# Patient Record
Sex: FEMALE | Race: WHITE | NOT HISPANIC OR LATINO | Employment: FULL TIME | URBAN - METROPOLITAN AREA
[De-identification: names, ages, dates, MRNs, and addresses within clinical notes are randomized per-mention and may not be internally consistent; named-entity substitution may affect disease eponyms.]

---

## 2024-02-05 ENCOUNTER — TELEPHONE (OUTPATIENT)
Dept: PRIMARY CARE | Facility: CLINIC | Age: 29
End: 2024-02-05
Payer: COMMERCIAL

## 2024-05-24 PROBLEM — E04.9 GOITER: Status: ACTIVE | Noted: 2020-09-15

## 2024-05-28 ENCOUNTER — OFFICE VISIT (OUTPATIENT)
Dept: PRIMARY CARE | Facility: CLINIC | Age: 29
End: 2024-05-28
Payer: COMMERCIAL

## 2024-05-28 VITALS
HEIGHT: 63 IN | BODY MASS INDEX: 33.66 KG/M2 | WEIGHT: 190 LBS | SYSTOLIC BLOOD PRESSURE: 112 MMHG | HEART RATE: 78 BPM | OXYGEN SATURATION: 99 % | DIASTOLIC BLOOD PRESSURE: 74 MMHG

## 2024-05-28 DIAGNOSIS — Z13.6 SCREENING FOR HEART DISEASE: ICD-10-CM

## 2024-05-28 DIAGNOSIS — Z13.1 SCREENING FOR DIABETES MELLITUS: ICD-10-CM

## 2024-05-28 DIAGNOSIS — Z00.00 WELL ADULT EXAM: Primary | ICD-10-CM

## 2024-05-28 DIAGNOSIS — E04.9 GOITER: ICD-10-CM

## 2024-05-28 PROCEDURE — 99395 PREV VISIT EST AGE 18-39: CPT | Performed by: NURSE PRACTITIONER

## 2024-05-28 ASSESSMENT — PATIENT HEALTH QUESTIONNAIRE - PHQ9
1. LITTLE INTEREST OR PLEASURE IN DOING THINGS: NOT AT ALL
2. FEELING DOWN, DEPRESSED OR HOPELESS: NOT AT ALL
SUM OF ALL RESPONSES TO PHQ9 QUESTIONS 1 AND 2: 0

## 2024-05-28 ASSESSMENT — PAIN SCALES - GENERAL: PAINLEVEL: 0-NO PAIN

## 2024-05-28 NOTE — PROGRESS NOTES
"Subjective   Patient ID: Chichi Trevino is a 28 y.o. female who presents for CPE (GYN: Aicha PAP: 4-2021).    JAZMIN Hernandez is a 29 yo F who presents for cpe  Diet is OK, all over the place. Stress eating   Exercise - just back into a gym routine, plays hockey and softball   Non smoker    GYN Dr Holcomb  LMP 5/22/24  Has excess vaginal discharge, mucus like  Hx of internal cysts  Previous abnormal PAP 7 years ago, normal since    Hx of depression/anxiety  More situational  Plucks eyebrows  Has been on meds in past without success  Prefers to stay off medications       Review of Systems  Constitutional Symptoms: negative for fever, loss of appetite, headaches, fatigue.   Eyes: negative for loss and blurring of vision, double vision.   Ear, Nose, Mouth, Throat: negative for hearing loss, tinnitus, nasal congestion, rhinorrhea, nose bleeds, teeth problems, mouth sores, gum disease, dysphagia, sore throat.   Cardiovascular: negative for chest pain/pressure, palpitations, edema, claudication.   Respiratory: negative for shortness of breath, dyspnea on exertion, pain with breathing, coughing.   Breast: negative for tenderness, masses, gynecomastia.   Gastrointestinal: negative for anorexia, indigestion, nausea, vomiting, abdominal pain, change in bowel habits, diarrhea, constipation, hematochezia, melena, blood in stool.    : Negative for urinary or genital complaints  Musculoskeletal: negative for joint pain, joint swelling, myalgia, cramps.   Integumentary: negative for change in mole, skin trouble or rash.   Neurological: negative for headache, numbness, tingling, weakness, tremors.   Psychiatric: negative for depression, anxiety.   Endocrine: negative for weight gain, heat or cold intolerance, polyuria, polydipsia, polyphagia.   Hematologic/Lymphatic: negative for bruising, abnormal bleeding, swollen glands     Objective   /74   Pulse 78   Ht 1.6 m (5' 3\")   Wt 86.2 kg (190 lb)   LMP 05/22/2024 (Exact " Date)   SpO2 99%   BMI 33.66 kg/m²     Physical Exam  General Appearance: Comfortable. She is well nourished, and well developed. She is awake, alert, and oriented and appears her stated age. The patient is cooperative with exam.  Head: Hair pattern reveals a normal pattern for patient's age and The face shows no abnormalities.  Eyes: PERRLA, EOMI, conjunctiva and sclera clear. Extraocular muscle exam reveals EOMI.  Ears, Nose, Mouth, Throat: Bilateral canals are normal. Both tympanic membranes are pearly gray and landmarks normal.    Nasal mucosa in both nostrils reveals no polyps, ulcerations, or lesions. Oral mucosa reveals no abnormalities and Teeth reveal good repair.  Neck: Neck reveals supple, no adenopathy or carotid bruits. + thyromegaly  Chest: Lungs are clear to auscultation bilaterally with no wheezes, rales, or rhonchi.  Cardiovascular: RRR without MRG. No edema  Breast:  gyn  Abdomen: Abdomen is soft, NT, ND with no masses.  Genitourinary: gyn  Lymph Nodes: Bilateral axillary lymph nodes are unremarkable. Bilateral inguinal lymph nodes are unremarkable.  Musculoskeletal: 5/5 and equal strength in bilateral upper and lower extremities.  Skin: Skin reveals good turgor and no rashes.  Neurological: Intact and non-focal. Cranial nerves II - XII are grossly intact.  Psychiatric: Patient has appropriate judgement. Patient has good insight. Patient's mood is appropriate.     Assessment/Plan   Diagnoses and all orders for this visit:  Well adult exam  -     Referral to Gynecology; Future  -     Urinalysis with Reflex Microscopic; Future  29 yo F well exam  Preventative screenings reviewed  Immunizations reviewed  Mediterranean diet, exercise, safety  Requests new referral to GYN  Follow up 6 months  Goiter  -     US thyroid; Future  -     TSH with reflex to Free T4 if abnormal; Future  Hx of right thyroid lobe enlargement with nodules.  Due for recheck  US and bw ordered  Screening for heart disease  -      Lipid Panel; Future  Screening for diabetes mellitus  -     Glucose, fasting; Future

## 2024-05-30 ENCOUNTER — HOSPITAL ENCOUNTER (OUTPATIENT)
Dept: RADIOLOGY | Facility: HOSPITAL | Age: 29
Discharge: HOME | End: 2024-05-30
Payer: COMMERCIAL

## 2024-05-30 DIAGNOSIS — E04.9 GOITER: ICD-10-CM

## 2024-05-30 PROCEDURE — 76536 US EXAM OF HEAD AND NECK: CPT | Performed by: RADIOLOGY

## 2024-05-30 PROCEDURE — 76536 US EXAM OF HEAD AND NECK: CPT

## 2024-07-22 ENCOUNTER — OFFICE VISIT (OUTPATIENT)
Dept: OBSTETRICS AND GYNECOLOGY | Facility: CLINIC | Age: 29
End: 2024-07-22
Payer: COMMERCIAL

## 2024-07-22 VITALS
WEIGHT: 190.4 LBS | DIASTOLIC BLOOD PRESSURE: 81 MMHG | SYSTOLIC BLOOD PRESSURE: 117 MMHG | HEIGHT: 63 IN | BODY MASS INDEX: 33.73 KG/M2

## 2024-07-22 DIAGNOSIS — N94.6 DYSMENORRHEA: Primary | ICD-10-CM

## 2024-07-22 DIAGNOSIS — Z31.89 ENCOUNTER FOR FERTILITY PLANNING: ICD-10-CM

## 2024-07-22 DIAGNOSIS — N92.0 MENORRHAGIA WITH REGULAR CYCLE: ICD-10-CM

## 2024-07-22 DIAGNOSIS — Z00.00 WELL ADULT EXAM: ICD-10-CM

## 2024-07-22 PROCEDURE — 1036F TOBACCO NON-USER: CPT | Performed by: OBSTETRICS & GYNECOLOGY

## 2024-07-22 PROCEDURE — 99203 OFFICE O/P NEW LOW 30 MIN: CPT | Performed by: OBSTETRICS & GYNECOLOGY

## 2024-07-22 PROCEDURE — 3008F BODY MASS INDEX DOCD: CPT | Performed by: OBSTETRICS & GYNECOLOGY

## 2024-07-22 PROCEDURE — 99213 OFFICE O/P EST LOW 20 MIN: CPT | Performed by: OBSTETRICS & GYNECOLOGY

## 2024-07-22 ASSESSMENT — PATIENT HEALTH QUESTIONNAIRE - PHQ9
2. FEELING DOWN, DEPRESSED OR HOPELESS: NOT AT ALL
1. LITTLE INTEREST OR PLEASURE IN DOING THINGS: NOT AT ALL
SUM OF ALL RESPONSES TO PHQ9 QUESTIONS 1 & 2: 0

## 2024-07-22 ASSESSMENT — ENCOUNTER SYMPTOMS
NAUSEA: 0
DYSURIA: 0
DEPRESSION: 0
VOMITING: 0
CHILLS: 0
COUGH: 0
ABDOMINAL PAIN: 0
DIZZINESS: 0
HEADACHES: 0
OCCASIONAL FEELINGS OF UNSTEADINESS: 0
UNEXPECTED WEIGHT CHANGE: 0
FATIGUE: 0
COLOR CHANGE: 0
SHORTNESS OF BREATH: 0
FEVER: 0
LOSS OF SENSATION IN FEET: 0

## 2024-07-22 ASSESSMENT — PAIN SCALES - GENERAL: PAINLEVEL: 0-NO PAIN

## 2024-07-22 ASSESSMENT — LIFESTYLE VARIABLES
HOW MANY STANDARD DRINKS CONTAINING ALCOHOL DO YOU HAVE ON A TYPICAL DAY: 1 OR 2
HOW OFTEN DO YOU HAVE SIX OR MORE DRINKS ON ONE OCCASION: LESS THAN MONTHLY
HOW OFTEN DO YOU HAVE A DRINK CONTAINING ALCOHOL: MONTHLY OR LESS
AUDIT-C TOTAL SCORE: 2
SKIP TO QUESTIONS 9-10: 0

## 2024-07-22 NOTE — PROGRESS NOTES
"Subjective   Patient ID: Chichi Trevino \"Katie" is a 29 y.o. female who presents for Fertility  evaluation    Pt currently in a same sex partnership, getting  soon, considering pregnancy, wondering re: options, want donor info, not using private donor             History of pelvic infections  no             History of abdominal surgery +lap appy (appendix close to bursting)              Menstrual irregularities [id number], heavy mense, lasting 5-6days, at times lasting 8-9days             Dysmenorrhea  +cramping, intense, taking tylenol/ibuprofen often first 3 days, last day of cycle; no pain prior to period, no pain w/BM             Partner history same sex partnership, has very irregular cycles, none x7yrs, pt will carry pregnancy.             Frequency of intercourse n/a             Prior infertility w/u  no             Prior infertility treatments  no                     Review of Systems   Constitutional:  Negative for chills, fatigue, fever and unexpected weight change.   Respiratory:  Negative for cough and shortness of breath.    Gastrointestinal:  Negative for abdominal pain, nausea and vomiting.   Genitourinary:  Positive for menstrual problem. Negative for dyspareunia, dysuria, pelvic pain and vaginal discharge.   Skin:  Negative for color change and rash.   Neurological:  Negative for dizziness and headaches.       Objective   Physical Exam  Constitutional:       Appearance: Normal appearance.   HENT:      Head: Normocephalic and atraumatic.   Skin:     General: Skin is warm and dry.   Neurological:      General: No focal deficit present.      Mental Status: She is alert.   Psychiatric:         Attention and Perception: Attention and perception normal.         Mood and Affect: Mood and affect normal.         Speech: Speech normal.         Behavior: Behavior is cooperative.         Assessment/Plan   Problem List Items Addressed This Visit    None  Visit Diagnoses         Codes    Dysmenorrhea "    -  Primary N94.6    Relevant Orders    US sonohysterogram    Well adult exam     Z00.00    Menorrhagia with regular cycle     N92.0    Relevant Orders    Follicle Stimulating Hormone    Prolactin    CBC    Testosterone,Free and Total    17-Hydroxyprogesterone    DHEA-Sulfate    US sonohysterogram    Encounter for fertility planning     Z31.89    Relevant Orders    Referral to Reproductive Endocrinology        Pt to be scheduled for Gyn annual in near future; will go over results at that time. Referral placed for IMMANUEL Land MD 07/22/24 9:13 AM

## 2024-10-01 ENCOUNTER — OFFICE VISIT (OUTPATIENT)
Dept: PRIMARY CARE | Facility: CLINIC | Age: 29
End: 2024-10-01
Payer: COMMERCIAL

## 2024-10-01 VITALS
SYSTOLIC BLOOD PRESSURE: 118 MMHG | WEIGHT: 184 LBS | HEART RATE: 85 BPM | BODY MASS INDEX: 32.59 KG/M2 | RESPIRATION RATE: 16 BRPM | TEMPERATURE: 98.1 F | OXYGEN SATURATION: 99 % | DIASTOLIC BLOOD PRESSURE: 84 MMHG

## 2024-10-01 DIAGNOSIS — R22.32 LEFT AXILLARY FULLNESS: Primary | ICD-10-CM

## 2024-10-01 PROBLEM — R05.9 COUGH, UNSPECIFIED: Status: RESOLVED | Noted: 2022-11-05 | Resolved: 2024-10-01

## 2024-10-01 PROBLEM — H57.12 PAIN OF LEFT EYE: Status: RESOLVED | Noted: 2023-04-18 | Resolved: 2024-10-01

## 2024-10-01 PROBLEM — N93.9 ABNORMAL UTERINE BLEEDING: Status: RESOLVED | Noted: 2024-10-01 | Resolved: 2024-10-01

## 2024-10-01 PROBLEM — N87.0 CERVICAL INTRAEPITHELIAL NEOPLASIA GRADE 1: Status: RESOLVED | Noted: 2024-10-01 | Resolved: 2024-10-01

## 2024-10-01 PROBLEM — R06.02 SHORTNESS OF BREATH: Status: RESOLVED | Noted: 2022-10-07 | Resolved: 2024-10-01

## 2024-10-01 PROBLEM — S05.90XA INJURY OF EYE REGION: Status: ACTIVE | Noted: 2023-04-18

## 2024-10-01 PROBLEM — M25.511 PAIN IN JOINT OF RIGHT SHOULDER: Status: RESOLVED | Noted: 2022-08-17 | Resolved: 2024-10-01

## 2024-10-01 PROBLEM — N92.6 IRREGULAR MENSTRUAL CYCLE: Status: RESOLVED | Noted: 2022-03-28 | Resolved: 2024-10-01

## 2024-10-01 PROBLEM — E66.811 CLASS 1 OBESITY: Status: RESOLVED | Noted: 2022-03-28 | Resolved: 2024-10-01

## 2024-10-01 PROBLEM — R94.6 ABNORMAL FINDING ON THYROID FUNCTION TEST: Status: ACTIVE | Noted: 2020-10-07

## 2024-10-01 PROBLEM — N89.9 NONINFLAMMATORY DISORDER OF VAGINA: Status: ACTIVE | Noted: 2021-02-26

## 2024-10-01 PROCEDURE — 1036F TOBACCO NON-USER: CPT | Performed by: NURSE PRACTITIONER

## 2024-10-01 PROCEDURE — 99213 OFFICE O/P EST LOW 20 MIN: CPT | Performed by: NURSE PRACTITIONER

## 2024-10-01 ASSESSMENT — ENCOUNTER SYMPTOMS
DIAPHORESIS: 0
ENDOCRINE NEGATIVE: 1
CHILLS: 0
BRUISES/BLEEDS EASILY: 0
FEVER: 0
MUSCULOSKELETAL NEGATIVE: 1
ACTIVITY CHANGE: 0
APPETITE CHANGE: 0
FATIGUE: 0
ADENOPATHY: 1

## 2024-10-01 ASSESSMENT — PATIENT HEALTH QUESTIONNAIRE - PHQ9
2. FEELING DOWN, DEPRESSED OR HOPELESS: NOT AT ALL
1. LITTLE INTEREST OR PLEASURE IN DOING THINGS: NOT AT ALL
SUM OF ALL RESPONSES TO PHQ9 QUESTIONS 1 AND 2: 0

## 2024-10-01 ASSESSMENT — PAIN SCALES - GENERAL: PAINLEVEL: 0-NO PAIN

## 2024-10-01 NOTE — PROGRESS NOTES
"Subjective   Patient ID: Mary Trevino is a 29 y.o. female who presents for Mass (Patient is here for lumps under B/L arm pits x 6 months but getting worse in the past 2 months, starting to hurt and grow. Patient refused the flu shot).    JAZMIN Hernandez is a 29-year-old female who complains of worsening \"lumps\" to left axilla that has been ongoing for the past 6 months.  Patient states they have gotten larger and have become tender over the past 2 months.  She denies any breast change  She denies any family history of breast cancer  She denies any recent illness  She denies any recent immunizations  Is more stressed lately due to upcoming wedding.    Review of Systems   Constitutional:  Negative for activity change, appetite change, chills, diaphoresis, fatigue and fever.   HENT: Negative.     Endocrine: Negative.    Musculoskeletal: Negative.    Skin: Negative.    Hematological:  Positive for adenopathy. Does not bruise/bleed easily.       Objective   /84 (BP Location: Left arm, Patient Position: Sitting, BP Cuff Size: Adult)   Pulse 85   Temp 36.7 °C (98.1 °F)   Resp 16   Wt 83.5 kg (184 lb)   LMP 09/17/2024   SpO2 99%   BMI 32.59 kg/m²     Physical Exam  Oriented x 3, no acute distress  Neck supple, no lymphadenopathy  No supraclavicular masses  Breathing unlabored  Bilateral breast symmetrical.  No masses appreciated. No skin change  Left axilla with fullness, tenderness. Small mobile mass noted. Right axilla nontender, no fullness, no masses  Skin warm and dry. No rash or redness  Neuro grossly intact    Assessment/Plan   Diagnoses and all orders for this visit:  Left axillary fullness  -     US lymph nodes; Future  Needs further evaluation  We will start with US axilla to assess fullness/tenderness  If needed, will add breast imaging  No vaccines, use ibuprofen prn, heating pad  Monitor for change  Follow up after testing       "

## 2024-10-03 ENCOUNTER — HOSPITAL ENCOUNTER (OUTPATIENT)
Dept: RADIOLOGY | Facility: HOSPITAL | Age: 29
Discharge: HOME | End: 2024-10-03
Payer: COMMERCIAL

## 2024-10-03 DIAGNOSIS — R22.32 LEFT AXILLARY FULLNESS: ICD-10-CM

## 2024-10-03 PROCEDURE — 76881 US COMPL JOINT R-T W/IMG: CPT

## 2024-10-28 ENCOUNTER — OFFICE VISIT (OUTPATIENT)
Dept: OBSTETRICS AND GYNECOLOGY | Facility: CLINIC | Age: 29
End: 2024-10-28
Payer: COMMERCIAL

## 2024-10-28 VITALS
HEIGHT: 63 IN | WEIGHT: 185.4 LBS | SYSTOLIC BLOOD PRESSURE: 123 MMHG | DIASTOLIC BLOOD PRESSURE: 84 MMHG | BODY MASS INDEX: 32.85 KG/M2

## 2024-10-28 DIAGNOSIS — Z01.419 ENCOUNTER FOR ANNUAL ROUTINE GYNECOLOGICAL EXAMINATION: Primary | ICD-10-CM

## 2024-10-28 DIAGNOSIS — Z11.51 ENCOUNTER FOR SCREENING FOR HUMAN PAPILLOMAVIRUS (HPV): ICD-10-CM

## 2024-10-28 PROCEDURE — 99395 PREV VISIT EST AGE 18-39: CPT | Performed by: OBSTETRICS & GYNECOLOGY

## 2024-10-28 PROCEDURE — 3008F BODY MASS INDEX DOCD: CPT | Performed by: OBSTETRICS & GYNECOLOGY

## 2024-10-28 PROCEDURE — 1036F TOBACCO NON-USER: CPT | Performed by: OBSTETRICS & GYNECOLOGY

## 2024-10-28 ASSESSMENT — PAIN SCALES - GENERAL: PAINLEVEL_OUTOF10: 0-NO PAIN

## 2024-10-28 ASSESSMENT — PATIENT HEALTH QUESTIONNAIRE - PHQ9
1. LITTLE INTEREST OR PLEASURE IN DOING THINGS: NOT AT ALL
2. FEELING DOWN, DEPRESSED OR HOPELESS: NOT AT ALL
SUM OF ALL RESPONSES TO PHQ9 QUESTIONS 1 & 2: 0

## 2024-10-28 ASSESSMENT — LIFESTYLE VARIABLES
HOW MANY STANDARD DRINKS CONTAINING ALCOHOL DO YOU HAVE ON A TYPICAL DAY: 1 OR 2
AUDIT-C TOTAL SCORE: 2
SKIP TO QUESTIONS 9-10: 0
HOW OFTEN DO YOU HAVE SIX OR MORE DRINKS ON ONE OCCASION: LESS THAN MONTHLY
HOW OFTEN DO YOU HAVE A DRINK CONTAINING ALCOHOL: MONTHLY OR LESS

## 2024-10-28 ASSESSMENT — ENCOUNTER SYMPTOMS
DEPRESSION: 0
OCCASIONAL FEELINGS OF UNSTEADINESS: 0
LOSS OF SENSATION IN FEET: 0

## 2024-11-08 ENCOUNTER — OFFICE VISIT (OUTPATIENT)
Dept: PRIMARY CARE | Facility: CLINIC | Age: 29
End: 2024-11-08
Payer: COMMERCIAL

## 2024-11-08 VITALS
DIASTOLIC BLOOD PRESSURE: 66 MMHG | OXYGEN SATURATION: 98 % | BODY MASS INDEX: 32.59 KG/M2 | TEMPERATURE: 98.3 F | WEIGHT: 184 LBS | SYSTOLIC BLOOD PRESSURE: 124 MMHG | HEART RATE: 86 BPM

## 2024-11-08 DIAGNOSIS — R09.81 SINUS CONGESTION: Primary | ICD-10-CM

## 2024-11-08 PROCEDURE — 99213 OFFICE O/P EST LOW 20 MIN: CPT | Performed by: STUDENT IN AN ORGANIZED HEALTH CARE EDUCATION/TRAINING PROGRAM

## 2024-11-08 RX ORDER — FLUTICASONE PROPIONATE 50 MCG
1 SPRAY, SUSPENSION (ML) NASAL DAILY
Qty: 16 G | Refills: 1 | Status: SHIPPED | OUTPATIENT
Start: 2024-11-08 | End: 2025-11-08

## 2024-11-08 RX ORDER — AZELASTINE 1 MG/ML
1 SPRAY, METERED NASAL 2 TIMES DAILY
Qty: 30 ML | Refills: 1 | Status: SHIPPED | OUTPATIENT
Start: 2024-11-08 | End: 2025-11-08

## 2024-11-08 ASSESSMENT — PAIN SCALES - GENERAL: PAINLEVEL_OUTOF10: 6

## 2024-11-08 NOTE — PROGRESS NOTES
"Subjective   Patient ID: Chichi Trevino \"Katie" is a 29 y.o. female who presents for Sore Throat (Right earache x 4 days/Sore throat has gotten worse).    Here today with sore, swollen throat and right ear pain.  Nose feels okay.  Did have fever at home 2 days ago but nothing since.    Patient states that she is feeling well overall, however family encouraged her to come in to be evaluated due to her wedding coming up in approximately a week.        Review of Systems   Constitutional:  Negative for chills, fatigue and fever.   HENT:  Positive for congestion, ear pain and sinus pressure. Negative for hearing loss, rhinorrhea, sinus pain and tinnitus.    Eyes:  Negative for visual disturbance.   Respiratory:  Positive for cough. Negative for shortness of breath and wheezing.    Cardiovascular:  Negative for chest pain, palpitations and leg swelling.   Gastrointestinal:  Negative for constipation, diarrhea, nausea and vomiting.   Endocrine: Negative for cold intolerance, heat intolerance, polydipsia and polyuria.   Genitourinary:  Negative for dysuria, frequency, menstrual problem, urgency and vaginal discharge.   Musculoskeletal:  Negative for arthralgias and myalgias.   Skin:  Negative for pallor, rash and wound.   Neurological:  Negative for dizziness, light-headedness and headaches.   Psychiatric/Behavioral:  Negative for dysphoric mood and sleep disturbance. The patient is not nervous/anxious.        Objective     Visit Vitals  /66 (BP Location: Left arm)   Pulse 86   Temp 36.8 °C (98.3 °F) (Temporal)   Wt 83.5 kg (184 lb)   LMP 10/15/2024 (Exact Date)   SpO2 98%   BMI 32.59 kg/m²   OB Status Having periods   Smoking Status Never   BSA 1.93 m²         Physical Exam  Constitutional:       Appearance: Normal appearance.   HENT:      Head: Normocephalic and atraumatic.      Right Ear: Ear canal and external ear normal. A middle ear effusion is present.      Left Ear: Tympanic membrane, ear canal and " external ear normal.      Nose: Congestion present.      Right Turbinates: Enlarged and pale.      Left Turbinates: Enlarged and pale.      Right Sinus: No maxillary sinus tenderness or frontal sinus tenderness.      Left Sinus: No maxillary sinus tenderness or frontal sinus tenderness.      Mouth/Throat:      Mouth: Mucous membranes are moist.      Pharynx: Oropharynx is clear.   Eyes:      Extraocular Movements: Extraocular movements intact.      Conjunctiva/sclera: Conjunctivae normal.      Pupils: Pupils are equal, round, and reactive to light.   Cardiovascular:      Rate and Rhythm: Normal rate and regular rhythm.      Pulses: Normal pulses.      Heart sounds: Normal heart sounds.   Pulmonary:      Effort: Pulmonary effort is normal.      Breath sounds: Normal breath sounds.   Abdominal:      General: There is no distension.      Palpations: Abdomen is soft.      Tenderness: There is no abdominal tenderness.   Musculoskeletal:         General: Normal range of motion.      Cervical back: Normal range of motion.   Lymphadenopathy:      Cervical: No cervical adenopathy.   Skin:     General: Skin is warm and dry.   Neurological:      Mental Status: She is alert and oriented to person, place, and time. Mental status is at baseline.   Psychiatric:         Mood and Affect: Mood normal.         Behavior: Behavior normal.         Assessment & Plan  Sinus congestion    Orders:    fluticasone (Flonase) 50 mcg/actuation nasal spray; Administer 1 spray into each nostril once daily. Shake gently. Before first use, prime pump. After use, clean tip and replace cap.    azelastine (Astelin) 137 mcg (0.1 %) nasal spray; Administer 1 spray into each nostril 2 times a day. Use in each nostril as directed  I do not see any clear infectious signs on physical exam.  There does appear to be allergic rhinitis with postnasal drip and congestion leading to a middle ear effusion.  I did recommend fluticasone and azelastine nasal sprays for  this to try to reduce swelling and promote drainage.  If she is not any better by Monday or Tuesday, she is encouraged to return for further evaluation so that if definitive treatment is needed we can expedite it prior to her upcoming wedding.       Reviewed and approved by [employer] on 11/9/24 at 11:24 AM.

## 2024-11-09 ASSESSMENT — ENCOUNTER SYMPTOMS
CONSTIPATION: 0
POLYDIPSIA: 0
FREQUENCY: 0
CHILLS: 0
SINUS PRESSURE: 1
RHINORRHEA: 0
WOUND: 0
SHORTNESS OF BREATH: 0
DIARRHEA: 0
FATIGUE: 0
SINUS PAIN: 0
DIZZINESS: 0
NERVOUS/ANXIOUS: 0
HEADACHES: 0
MYALGIAS: 0
NAUSEA: 0
WHEEZING: 0
SLEEP DISTURBANCE: 0
ARTHRALGIAS: 0
PALPITATIONS: 0
DYSPHORIC MOOD: 0
FEVER: 0
VOMITING: 0
LIGHT-HEADEDNESS: 0
COUGH: 1
DYSURIA: 0

## 2024-11-12 ENCOUNTER — OFFICE VISIT (OUTPATIENT)
Dept: PRIMARY CARE | Facility: CLINIC | Age: 29
End: 2024-11-12
Payer: COMMERCIAL

## 2024-11-12 VITALS
WEIGHT: 186 LBS | OXYGEN SATURATION: 99 % | HEART RATE: 80 BPM | BODY MASS INDEX: 32.96 KG/M2 | TEMPERATURE: 98.8 F | SYSTOLIC BLOOD PRESSURE: 118 MMHG | HEIGHT: 63 IN | DIASTOLIC BLOOD PRESSURE: 76 MMHG

## 2024-11-12 DIAGNOSIS — H65.191 OTHER NON-RECURRENT ACUTE NONSUPPURATIVE OTITIS MEDIA OF RIGHT EAR: Primary | ICD-10-CM

## 2024-11-12 DIAGNOSIS — J20.8 ACUTE BRONCHITIS DUE TO OTHER SPECIFIED ORGANISMS: ICD-10-CM

## 2024-11-12 PROCEDURE — 3008F BODY MASS INDEX DOCD: CPT | Performed by: NURSE PRACTITIONER

## 2024-11-12 PROCEDURE — 99214 OFFICE O/P EST MOD 30 MIN: CPT | Performed by: NURSE PRACTITIONER

## 2024-11-12 PROCEDURE — 1036F TOBACCO NON-USER: CPT | Performed by: NURSE PRACTITIONER

## 2024-11-12 RX ORDER — BENZONATATE 200 MG/1
200 CAPSULE ORAL 3 TIMES DAILY PRN
Qty: 21 CAPSULE | Refills: 0 | Status: SHIPPED | OUTPATIENT
Start: 2024-11-12 | End: 2024-11-19

## 2024-11-12 RX ORDER — ALBUTEROL SULFATE 90 UG/1
2 INHALANT RESPIRATORY (INHALATION) EVERY 6 HOURS PRN
Qty: 8 G | Refills: 0 | Status: SHIPPED | OUTPATIENT
Start: 2024-11-12 | End: 2024-12-12

## 2024-11-12 RX ORDER — AMOXICILLIN AND CLAVULANATE POTASSIUM 875; 125 MG/1; MG/1
875 TABLET, FILM COATED ORAL 2 TIMES DAILY
Qty: 20 TABLET | Refills: 0 | Status: SHIPPED | OUTPATIENT
Start: 2024-11-12 | End: 2024-11-22

## 2024-11-12 ASSESSMENT — ENCOUNTER SYMPTOMS
DIARRHEA: 0
SHORTNESS OF BREATH: 0
NAUSEA: 0
RHINORRHEA: 0
COUGH: 1
SORE THROAT: 0
FEVER: 0
HEADACHES: 0
VOMITING: 0
DIZZINESS: 0
SINUS PRESSURE: 0
CHILLS: 0
SINUS PAIN: 0
ARTHRALGIAS: 0

## 2024-11-12 ASSESSMENT — COLUMBIA-SUICIDE SEVERITY RATING SCALE - C-SSRS: 1. IN THE PAST MONTH, HAVE YOU WISHED YOU WERE DEAD OR WISHED YOU COULD GO TO SLEEP AND NOT WAKE UP?: NO

## 2024-11-12 ASSESSMENT — PATIENT HEALTH QUESTIONNAIRE - PHQ9
2. FEELING DOWN, DEPRESSED OR HOPELESS: NOT AT ALL
SUM OF ALL RESPONSES TO PHQ9 QUESTIONS 1 AND 2: 0
1. LITTLE INTEREST OR PLEASURE IN DOING THINGS: NOT AT ALL

## 2024-11-12 ASSESSMENT — PAIN SCALES - GENERAL: PAINLEVEL_OUTOF10: 7

## 2024-11-12 NOTE — PROGRESS NOTES
"Subjective   Patient ID: Mary Trevino is a 29 y.o. female who presents for Sick Visit (Ear pain,nasal congestion and burning in her nose . Did COVID test on Friday and it was negative . ).    JAZMIN Hernandez is a 29-year-old female who complains of worsening ear pain, nasal congestion, burning in her nose and cough  Sx began 1 week ago  Worsening - has bilateral ear pain, nasal burning and cough    Was seen in the office on 11 8 and prescribed Flonase and Astelin nasal spray for suspected allergies  She is using these medications and symptoms are worsening    Home covid test negative  No contacts ill    Using nasal sprays and dayquil/nyquil    Patient is getting  this weekend    Review of Systems   Constitutional:  Negative for chills and fever.   HENT:  Positive for congestion and ear pain. Negative for postnasal drip, rhinorrhea, sinus pressure, sinus pain, sneezing and sore throat.    Respiratory:  Positive for cough (thick yellow). Negative for shortness of breath.    Cardiovascular:  Negative for chest pain.   Gastrointestinal:  Negative for diarrhea, nausea and vomiting.   Musculoskeletal:  Negative for arthralgias.   Skin:  Negative for rash.   Neurological:  Negative for dizziness and headaches.       Objective   /76   Pulse 80   Temp 37.1 °C (98.8 °F)   Ht 1.6 m (5' 3\")   Wt 84.4 kg (186 lb)   LMP 10/15/2024 (Exact Date)   SpO2 99%   BMI 32.95 kg/m²     Physical Exam  Gen: A/O x3 NAD  Eyes: WNL  ENT: Right TM red and bulging.  Left TM dull. Auditory canals wnl. Bilat nares red  and patent with clear rhinorrhea. + Posterior pharynx erythema. No exudate. Uvula midline.   Lungs: Breath sounds  coarse bilaterally with harsh  cough on forced expiration. No wheeze. Speaks complete sentences/Unlabored.  Heart: RRR, no murmur  Neck: supple, no adenopathy  Skin: warm/dry, no rash  Neuro: grossly intact    Assessment/Plan   Diagnoses and all orders for this visit:  Other non-recurrent acute " nonsuppurative otitis media of right ear  -     amoxicillin-pot clavulanate (Augmentin) 875-125 mg tablet; Take 1 tablet (875 mg) by mouth 2 times a day for 10 days.  Needs better control  Likely viral URI which has progressed   Will cover with antibiotic due to right otitis media  Fluids, rest, OTC as directed  Follow-up 7 to 10 days if not improving, sooner if worse  Acute bronchitis due to other specified organisms  -     albuterol (Ventolin HFA) 90 mcg/actuation inhaler; Inhale 2 puffs every 6 hours if needed for wheezing, shortness of breath or other (cough).  -     benzonatate (Tessalon) 200 mg capsule; Take 1 capsule (200 mg) by mouth 3 times a day as needed for cough for up to 7 days. Do not crush or chew.  Needs better control  Likely viral URI which has progressed  Will use albuterol and Tessalon for bronchitis and intractable cough with forced expiration  Fluids, rest, humidifier, OTC as directed  Follow-up 7 to 10 days if not improving, sooner if worse.  Would need chest x-ray

## 2024-12-16 ENCOUNTER — LAB (OUTPATIENT)
Dept: LAB | Facility: LAB | Age: 29
End: 2024-12-16
Payer: COMMERCIAL

## 2024-12-16 ENCOUNTER — CONSULT (OUTPATIENT)
Dept: ENDOCRINOLOGY | Facility: CLINIC | Age: 29
End: 2024-12-16
Payer: COMMERCIAL

## 2024-12-16 VITALS
HEART RATE: 64 BPM | BODY MASS INDEX: 32.44 KG/M2 | WEIGHT: 190 LBS | DIASTOLIC BLOOD PRESSURE: 84 MMHG | SYSTOLIC BLOOD PRESSURE: 125 MMHG | HEIGHT: 64 IN

## 2024-12-16 DIAGNOSIS — Z01.83 ENCOUNTER FOR RH BLOOD TYPING: ICD-10-CM

## 2024-12-16 DIAGNOSIS — Z13.29 SCREENING FOR THYROID DISORDER: ICD-10-CM

## 2024-12-16 DIAGNOSIS — Z31.81 ENCOUNTER FOR MALE FACTOR INFERTILITY IN FEMALE PATIENT: Primary | ICD-10-CM

## 2024-12-16 DIAGNOSIS — Z31.89 ENCOUNTER FOR FERTILITY PLANNING: ICD-10-CM

## 2024-12-16 DIAGNOSIS — Z31.41 FERTILITY TESTING: ICD-10-CM

## 2024-12-16 DIAGNOSIS — Z11.59 ENCOUNTER FOR SCREENING FOR OTHER VIRAL DISEASES: ICD-10-CM

## 2024-12-16 DIAGNOSIS — Z01.812 ENCOUNTER FOR PREPROCEDURAL LABORATORY EXAMINATION: ICD-10-CM

## 2024-12-16 DIAGNOSIS — N97.8 ENCOUNTER FOR MALE FACTOR INFERTILITY IN FEMALE PATIENT: Primary | ICD-10-CM

## 2024-12-16 DIAGNOSIS — Z11.3 SCREENING FOR STDS (SEXUALLY TRANSMITTED DISEASES): ICD-10-CM

## 2024-12-16 LAB
ABO GROUP (TYPE) IN BLOOD: NORMAL
ANTIBODY SCREEN: NORMAL
CMV IGG AVIDITY SERPL IA-RTO: NONREACTIVE %
HBV SURFACE AG SERPL QL IA: NONREACTIVE
HCV AB SER QL: NONREACTIVE
HIV 1+2 AB+HIV1 P24 AG SERPL QL IA: NONREACTIVE
RH FACTOR (ANTIGEN D): NORMAL
TSH SERPL-ACNC: 1.5 MIU/L (ref 0.44–3.98)

## 2024-12-16 PROCEDURE — 86850 RBC ANTIBODY SCREEN: CPT

## 2024-12-16 PROCEDURE — 99204 OFFICE O/P NEW MOD 45 MIN: CPT

## 2024-12-16 PROCEDURE — 86780 TREPONEMA PALLIDUM: CPT

## 2024-12-16 PROCEDURE — 87491 CHLMYD TRACH DNA AMP PROBE: CPT

## 2024-12-16 PROCEDURE — 87340 HEPATITIS B SURFACE AG IA: CPT

## 2024-12-16 PROCEDURE — 83516 IMMUNOASSAY NONANTIBODY: CPT

## 2024-12-16 PROCEDURE — 84443 ASSAY THYROID STIM HORMONE: CPT

## 2024-12-16 PROCEDURE — 86317 IMMUNOASSAY INFECTIOUS AGENT: CPT

## 2024-12-16 PROCEDURE — 86644 CMV ANTIBODY: CPT

## 2024-12-16 PROCEDURE — 86787 VARICELLA-ZOSTER ANTIBODY: CPT

## 2024-12-16 PROCEDURE — 87591 N.GONORRHOEAE DNA AMP PROB: CPT

## 2024-12-16 PROCEDURE — 86803 HEPATITIS C AB TEST: CPT

## 2024-12-16 PROCEDURE — 87389 HIV-1 AG W/HIV-1&-2 AB AG IA: CPT

## 2024-12-16 PROCEDURE — 86900 BLOOD TYPING SEROLOGIC ABO: CPT

## 2024-12-16 PROCEDURE — 86645 CMV ANTIBODY IGM: CPT

## 2024-12-16 PROCEDURE — 36415 COLL VENOUS BLD VENIPUNCTURE: CPT

## 2024-12-16 PROCEDURE — 86901 BLOOD TYPING SEROLOGIC RH(D): CPT

## 2024-12-16 PROCEDURE — 99214 OFFICE O/P EST MOD 30 MIN: CPT

## 2024-12-16 ASSESSMENT — COLUMBIA-SUICIDE SEVERITY RATING SCALE - C-SSRS
6. HAVE YOU EVER DONE ANYTHING, STARTED TO DO ANYTHING, OR PREPARED TO DO ANYTHING TO END YOUR LIFE?: NO
1. IN THE PAST MONTH, HAVE YOU WISHED YOU WERE DEAD OR WISHED YOU COULD GO TO SLEEP AND NOT WAKE UP?: NO
2. HAVE YOU ACTUALLY HAD ANY THOUGHTS OF KILLING YOURSELF?: NO

## 2024-12-16 ASSESSMENT — PATIENT HEALTH QUESTIONNAIRE - PHQ9
SUM OF ALL RESPONSES TO PHQ9 QUESTIONS 1 AND 2: 0
1. LITTLE INTEREST OR PLEASURE IN DOING THINGS: NOT AT ALL
2. FEELING DOWN, DEPRESSED OR HOPELESS: NOT AT ALL

## 2024-12-16 ASSESSMENT — PAIN SCALES - GENERAL: PAINLEVEL_OUTOF10: 0-NO PAIN

## 2024-12-17 LAB
C TRACH RRNA SPEC QL NAA+PROBE: NEGATIVE
N GONORRHOEA DNA SPEC QL PROBE+SIG AMP: NEGATIVE
RUBV IGG SERPL IA-ACNC: 1.6 IA
RUBV IGG SERPL QL IA: POSITIVE
TREPONEMA PALLIDUM IGG+IGM AB [PRESENCE] IN SERUM OR PLASMA BY IMMUNOASSAY: NONREACTIVE
VARICELLA ZOSTER IGG INDEX: 1.1 IA
VZV IGG SER QL IA: POSITIVE

## 2024-12-18 ENCOUNTER — ANCILLARY PROCEDURE (OUTPATIENT)
Dept: ENDOCRINOLOGY | Facility: CLINIC | Age: 29
End: 2024-12-18
Payer: COMMERCIAL

## 2024-12-18 ENCOUNTER — DOCUMENTATION (OUTPATIENT)
Dept: ENDOCRINOLOGY | Facility: CLINIC | Age: 29
End: 2024-12-18

## 2024-12-18 DIAGNOSIS — Z31.41 FERTILITY TESTING: ICD-10-CM

## 2024-12-18 LAB — CMV IGM SERPL-ACNC: <8 AU/ML

## 2024-12-18 PROCEDURE — 76830 TRANSVAGINAL US NON-OB: CPT | Performed by: OBSTETRICS & GYNECOLOGY

## 2024-12-18 PROCEDURE — 76830 TRANSVAGINAL US NON-OB: CPT

## 2024-12-19 LAB — MIS SERPL-MCNC: 3.27 NG/ML (ref 0.18–11.71)

## 2024-12-19 NOTE — PROGRESS NOTES
Message to patient regarding negative CMV titers:    Yordy Hernandez, I hope you are having a good week so far. I wanted to reach out regarding the CMV Titers: so the IgG, which we talked about, is a test that shows if you have ever been exposed to CMV and yours was negative, no past exposure. Your CMV IgM, which tells if you have a current infection, resulted and you are negative, so no active infection either. When you are cleared & ready to look at sperm donor profiles, you will need to choose a donor that is CMV negative. Please reach back if you have any questions, Rose :)     Rose Catalan, CNP 12/18/24 10:16 PM

## 2025-01-16 ENCOUNTER — HOSPITAL ENCOUNTER (OUTPATIENT)
Dept: RADIOLOGY | Facility: HOSPITAL | Age: 30
Discharge: HOME | End: 2025-01-16
Payer: COMMERCIAL

## 2025-01-16 ENCOUNTER — LAB (OUTPATIENT)
Dept: LAB | Facility: LAB | Age: 30
End: 2025-01-16
Payer: COMMERCIAL

## 2025-01-16 ENCOUNTER — ALLIED HEALTH (OUTPATIENT)
Dept: GENETICS | Facility: CLINIC | Age: 30
End: 2025-01-16
Payer: COMMERCIAL

## 2025-01-16 ENCOUNTER — ANCILLARY PROCEDURE (OUTPATIENT)
Dept: ENDOCRINOLOGY | Facility: CLINIC | Age: 30
End: 2025-01-16
Payer: COMMERCIAL

## 2025-01-16 VITALS
WEIGHT: 193.25 LBS | HEART RATE: 76 BPM | BODY MASS INDEX: 32.99 KG/M2 | SYSTOLIC BLOOD PRESSURE: 120 MMHG | DIASTOLIC BLOOD PRESSURE: 84 MMHG | HEIGHT: 64 IN

## 2025-01-16 DIAGNOSIS — Z01.812 ENCOUNTER FOR PREPROCEDURAL LABORATORY EXAMINATION: Primary | ICD-10-CM

## 2025-01-16 DIAGNOSIS — Z31.89 ENCOUNTER FOR FERTILITY PLANNING: ICD-10-CM

## 2025-01-16 DIAGNOSIS — Z31.5 ENCOUNTER FOR PROCREATIVE GENETIC COUNSELING: ICD-10-CM

## 2025-01-16 DIAGNOSIS — Z31.41 FERTILITY TESTING: ICD-10-CM

## 2025-01-16 DIAGNOSIS — Z31.430 ENCOUNTER OF FEMALE FOR TESTING FOR GENETIC DISEASE CARRIER STATUS FOR PROCREATIVE MANAGEMENT: ICD-10-CM

## 2025-01-16 LAB — PREGNANCY TEST URINE, POC: NEGATIVE

## 2025-01-16 PROCEDURE — 9990000009 MISCELLANEOUS GENETICS TEST

## 2025-01-16 PROCEDURE — 58340 CATHETER FOR HYSTEROGRAPHY: CPT

## 2025-01-16 PROCEDURE — 2550000001 HC RX 255 CONTRASTS

## 2025-01-16 PROCEDURE — 96041 GENETIC COUNSELING SVC EA 30: CPT

## 2025-01-16 RX ADMIN — IOHEXOL 20 ML: 240 INJECTION, SOLUTION INTRATHECAL; INTRAVASCULAR; INTRAVENOUS; ORAL at 08:21

## 2025-01-16 ASSESSMENT — COLUMBIA-SUICIDE SEVERITY RATING SCALE - C-SSRS
6. HAVE YOU EVER DONE ANYTHING, STARTED TO DO ANYTHING, OR PREPARED TO DO ANYTHING TO END YOUR LIFE?: NO
2. HAVE YOU ACTUALLY HAD ANY THOUGHTS OF KILLING YOURSELF?: NO
1. IN THE PAST MONTH, HAVE YOU WISHED YOU WERE DEAD OR WISHED YOU COULD GO TO SLEEP AND NOT WAKE UP?: NO

## 2025-01-16 ASSESSMENT — PAIN SCALES - GENERAL: PAINLEVEL_OUTOF10: 5

## 2025-01-16 NOTE — PROGRESS NOTES
Thank you for the referral of Ms. Chichi Trevino. she is a 29 y.o.  female who was seen for genetic counseling due to desired use of a gamete donor in future assisted reproductive technology cycles.    PAST HISTORY:   Patient is in a same sex relationship with partner, Anny. Patient reports no health concerns. Anny has partial unilateral hearing loss since birth that is non-progressive. Patient states that Anny does not plan to conceive in the future.    Patient is CMV negative and has no prior carrier screening.    FAMILY HISTORY  Medical and family histories were reviewed and the following concerns were apparent:  Patient:  -Father was adopted from non-blood related family and they have no information on his biological family; he is 60 years old and healthy.   -Maternal aunt (living) has had hearing problems since birth that have worsened with age. 3 of her 4 children (Ms. Trevino's first cousins) also have hearing loss.   -Maternal grandfather (, 40s or 50s)  from a heart attack.  -Maternal grandmother (, 50s or 60s)  of cancer, not otherwise specified.     Patient's partner:  -Mother (living, 69) also has hearing loss diagnosed as a child.     The remainder of the family history was negative for birth defects, intellectual disability, recurrent pregnancy loss, or recognized inherited conditions. Consanguinity denied.    REPORTED ETHNICITY/RACE:  Patient: White  Patient's partner: White    COUNSELING:    The following information was discussed with your patient:    Per updated ACOG recommendations from 2017, we discussed the availability, benefits, and limitations of carrier screening. If both gamete contributors are found to be carriers for the same condition, there may be a 25% chance for an affected child and prenatal diagnosis would be available. Negative carrier screening does not rule out the possibility of being a carrier.  screening is available for CF,  hemoglobinopathies, and thalassemias, and SMA.   In accordance with the most recent carrier screening guidelines from ACMG published in 2021, we also discussed the availability of more expanded carrier screening for additional, primarily autosomal recessive, and some X-linked conditions. Current ACMG guidelines recommend all pregnant patients and those planning a pregnancy should be offered Tier 3 carrier screening. Tier 3 carrier screening includes conditions with a carrier frequency >=1/200 in any ethnic group with reasonable representation in the [place]. Larger panels that include conditions less common than those in Tier 3 are also available, and are considered Tier 4 screening. There are different carrier screening panels available ranging from 3 of the most common genetic risk factors, to ~175 primarily autosomal recessive/X-linked conditions, to 600+ disorders. Range of clinical features that may be associated with conditions screened for were reviewed. Approximately 2-4% of biological parents are found to be at risk to have a child with a genetic disorder based on this screening. In rare cases, expanded carrier screening results may have health implications for the tested individual.    When utilizing an anonymous donor for conception, intended parents must use an approved bank for donor selection after review of donor profiles. Per [facility] any donor must be approved prior to sperm being purchased and utilized for conception. Included in the donor profile(s) is any genetic screening that the donor may have had. The most common form of screening is expanded carrier screening, in which the donor is screened for a number of autosomal recessive and/or X-linked genetic conditions and determined to be a carrier or unlikely to be a carrier of each disorder tested. The specific carrier testing panel (number and types of diseases included) is variable from donor to donor, even when donors are from the  same bank. As noted above, if both gamete sources are carriers of the same autosomal recessive condition, there would be a 25% chance for each conception to be affected with the disorder. In this event, selecting an alternative donor, or performing preimplantation genetic testing for the disorder of concern may be considered.   It is typically recommended by [facility] for the intended biological parent to have expanded carrier screening utilizing a comprehensive panel that includes testing for the majority of conditions recommended by ACOG and ACMG prior to reviewing donor profiles, so that the intended parents are aware of any common diseases they may be a carrier of, and they can attempt to select a donor that has had negative screening for said disease(s). In addition, patients may have more flexibility in selecting a donor that is a carrier of a common disease if they are already aware that they have screened negative for said disease.    If a donor is a carrier of a condition that the patient has not been screened for, additional screening can be performed on the intended parent if desired; however, if the patient is a carrier of a condition that the potential donor has not been screened for, additional screening for the donor cannot be guaranteed. If further testing is not performed, the couple can select an alternate donor, or they can proceed with the selected donor once they have been informed of the potential risk of an affected child in the absence of additional carrier screening. Carriers generally do not have symptoms.   If the patient does not want to proceed with carrier screening at this time, carrier screening can be declined altogether, or it can be deferred and testing can be performed in the context of screening for any diseases that may be of concern for any potential donor.   The types of genes that are analyzed on carrier screening can cause conditions with a range in severity, clinical symptoms,  age of onset, management, and whether treatment is available. In addition, the number of conditions and specific conditions tested for on any given panel is not standardized, and genetic screening ordered for gamete donors is not consistent across zhu. They are often not screened for other types of hereditary disease, such as predisposition to cancers, heart diseases, neurological problems, etc. They sometimes have screening of their chromosomes, which can assist in understanding risks of chromosome conditions in a conception from that donor. Any genetic testing that is or is not completed through a bank can be discussed with a genetic counselor once a donor is selected.     If patient proceeds with carrier screening, once results are issued and other recommendations by their IMMANUEL provider are completed, IMMANUEL team provides clearance to begin searching for a donor. If patient's carrier screening is negative, they would need to ensure that they have screened negative for any disease(s) that a selected donor may have screened positive for. If carrier screening is positive, they would additionally need to review that the donor has screened negative for any disease the patient has tested positive for. We recommend additional genetic counseling appointment to review the patient and donor's test results for clearance.  Of note, carrier screening can be delayed until a donor is selected in the future. However, results take 2-4 weeks to return and this could delay assisted reproductive technology cycles.   Additional family history concerns:  Family history of hearing loss. Hearing loss can be due to many factors; some genetic and some non-genetic. We discussed that the most common genetic cause of non-syndromic hearing loss are mutations in the GJB2 gene, which is assessed on carrier screening. The only way to know with certainty if there is a genetic risk for Ms. Trevino's future children to have hearing loss would to be  to have the affected individuals pursue genetic screening.   Multifactorial inheritance. We reviewed that conditions such as heart disease and late onset cancers are often considered multifactorial, meaning that they are due to some combination of genetic and environmental risk factors. While we do not have specific testing to assess exact risk, we know that relatives of affected individuals have an elevated risk when compared to the general population of being similarly affected. Chichi should inform her doctors, as well as any future pediatricians, of this family history to allow for early intervention and to maximize outcomes.     DISPOSITION:  The patient stated that she understood the above information and elected to proceed with the 113-gene carrier screening containing the ACMG general population recommended genes. Patient was handed a genetic testing kit and sent to the lab for a blood draw today.. Results will take about 3 weeks to return, at which time results and follow up plan will be discussed with patient. Sperm profiles will then be searched after the results of carrier screening return.    Total time spent on day of encounter: 40 minutes (30 minutes with patient, 10 minutes on pre/post patient care activities, including documentation).    Thank you for allowing us to participate in the care of your patient. Should you or your patient have any questions, please do not hesitate to contact our office at [phone].    Sincerely,   Zara Bran [facility]   Licensed and Certified Genetic Counselor     Reviewed by:  Dr. Kera Kelley MD  Reproductive Endocrinology and Infertility

## 2025-01-16 NOTE — PROCEDURES
Hysterosalpingogram (HSG)    Date/Time: 1/16/2025 12:56 PM    Performed by: SCOTTY Carty  Authorized by: SCOTTY Carty    Consent:     Consent obtained:  Verbal and written    Consent given by:  Patient    Risks, benefits, and alternatives were discussed: yes      Risks discussed:  Bleeding, infection and pain  Universal protocol:     Procedure explained and questions answered to patient or proxy's satisfaction: yes      Relevant documents present and verified: yes      Test results available: yes      Imaging studies available: yes      Required blood products, implants, devices, and special equipment available: yes      Immediately prior to procedure, a time out was called: yes      Patient identity confirmed:  Verbally with patient, hospital-assigned identification number and arm band  Indications:     Indications:  Fertility testing  Pre-procedure details:     Skin preparation:  Povidone-iodine  Sedation:     Sedation type:  None  Anesthesia:     Anesthesia method:  None  Procedure specific details:      BAMBI Catalan CNP performed this procedure      Hysterosalpingogram (HSG) risks, benefits, alternatives, and personnel discussed with patient who agreed to proceed.    Procedural time out        Done in room where procedure done: Yes        Done just before starting procedure: Yes                                                 All members of procedural team involved in time-out: Yes                  Active communication used: Yes                                                         All team members agreed on procedure: Yes                                        Patient correctly identified by two identifiers: Yes                                  Correct side and site identified: Yes                                                     All needed special equipment/instruments available: Yes               Prior to the start of the procedure a time out was taken and the following were verified:  The identity of the patient using two patient identifiers.   Urine pregnancy test was performed and was negative.   Risks, benefits, and alternatives of the procedure were explained to the patient.  Informed consent was obtained.     The patient was placed in the dorsal lithotomy position and a sterile speculum was placed in the vagina. The cervix was sterilized with Betadine x 3. The anterior lip of the cervix was grasped with a single-tooth tenaculum. The acorn cannula was then placed in the cervix. The patient was positioned and images were taken with fluoroscopy as dye was inserted through the cannula. All instruments were then removed. The patient tolerated the procedure well and was discharged home the same day without complications.    Uterus: slightly arcuate uterine contour without filling defects, normal pelvic ultrasound 12-- fundal indent 0.57 cm.  Tubes:  bilateral patency with free spill of dye, normal tubal architecture noted, and no loculations present.  Based on these findings, my recommendation is: No further follow up required. Dr. Whitehead reviewed the images and agrees with the above findings.     The patient was counseled regarding the above findings and images were reviewed with patient at the time of the exam.     Rose Catalan CNP 01/16/25 12:58 PM       Post-procedure details:     Procedure completion:  Tolerated well, no immediate complications

## 2025-01-16 NOTE — PROGRESS NOTES
Patient presents for an HSG, POCT UPT negative today, see [facility] Radiology schedule for notation. Rose Catalan CNP 01/16/25 1:43 PM

## 2025-01-17 ENCOUNTER — APPOINTMENT (OUTPATIENT)
Dept: BEHAVIORAL HEALTH | Facility: CLINIC | Age: 30
End: 2025-01-17
Payer: COMMERCIAL

## 2025-01-17 DIAGNOSIS — Z31.69 INFERTILITY COUNSELING: Primary | ICD-10-CM

## 2025-01-17 DIAGNOSIS — F43.10 PTSD (POST-TRAUMATIC STRESS DISORDER): ICD-10-CM

## 2025-01-17 PROCEDURE — 90791 PSYCH DIAGNOSTIC EVALUATION: CPT | Performed by: PSYCHOLOGIST
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
## 2025-01-31 ENCOUNTER — APPOINTMENT (OUTPATIENT)
Dept: RADIOLOGY | Facility: HOSPITAL | Age: 30
End: 2025-01-31
Payer: COMMERCIAL

## 2025-01-31 ENCOUNTER — HOSPITAL ENCOUNTER (EMERGENCY)
Facility: HOSPITAL | Age: 30
Discharge: HOME | End: 2025-01-31
Attending: EMERGENCY MEDICINE
Payer: COMMERCIAL

## 2025-01-31 VITALS
RESPIRATION RATE: 16 BRPM | TEMPERATURE: 96.6 F | OXYGEN SATURATION: 99 % | DIASTOLIC BLOOD PRESSURE: 75 MMHG | SYSTOLIC BLOOD PRESSURE: 113 MMHG | HEART RATE: 74 BPM | BODY MASS INDEX: 34.02 KG/M2 | HEIGHT: 63 IN | WEIGHT: 192 LBS

## 2025-01-31 DIAGNOSIS — R10.9 FLANK PAIN: Primary | ICD-10-CM

## 2025-01-31 DIAGNOSIS — R10.31 RIGHT LOWER QUADRANT ABDOMINAL PAIN: ICD-10-CM

## 2025-01-31 DIAGNOSIS — N20.1 URETEROLITHIASIS: ICD-10-CM

## 2025-01-31 DIAGNOSIS — R11.0 NAUSEA WITHOUT VOMITING: ICD-10-CM

## 2025-01-31 DIAGNOSIS — I10 DIASTOLIC HYPERTENSION: ICD-10-CM

## 2025-01-31 LAB
ALBUMIN SERPL BCP-MCNC: 4.2 G/DL (ref 3.4–5)
ALP SERPL-CCNC: 37 U/L (ref 33–110)
ALT SERPL W P-5'-P-CCNC: 22 U/L (ref 7–45)
ANION GAP SERPL CALCULATED.3IONS-SCNC: 14 MMOL/L (ref 10–20)
APPEARANCE UR: CLEAR
AST SERPL W P-5'-P-CCNC: 11 U/L (ref 9–39)
BACTERIA #/AREA URNS AUTO: ABNORMAL /HPF
BASOPHILS # BLD AUTO: 0.04 X10*3/UL (ref 0–0.1)
BASOPHILS NFR BLD AUTO: 0.5 %
BILIRUB SERPL-MCNC: 0.7 MG/DL (ref 0–1.2)
BILIRUB UR STRIP.AUTO-MCNC: NEGATIVE MG/DL
BUN SERPL-MCNC: 17 MG/DL (ref 6–23)
CALCIUM SERPL-MCNC: 8.8 MG/DL (ref 8.6–10.3)
CHLORIDE SERPL-SCNC: 108 MMOL/L (ref 98–107)
CO2 SERPL-SCNC: 20 MMOL/L (ref 21–32)
COLOR UR: ABNORMAL
CREAT SERPL-MCNC: 0.78 MG/DL (ref 0.5–1.05)
EGFRCR SERPLBLD CKD-EPI 2021: >90 ML/MIN/1.73M*2
EOSINOPHIL # BLD AUTO: 0.25 X10*3/UL (ref 0–0.7)
EOSINOPHIL NFR BLD AUTO: 2.9 %
ERYTHROCYTE [DISTWIDTH] IN BLOOD BY AUTOMATED COUNT: 13.1 % (ref 11.5–14.5)
GLUCOSE SERPL-MCNC: 124 MG/DL (ref 74–99)
GLUCOSE UR STRIP.AUTO-MCNC: NORMAL MG/DL
HCG UR QL IA.RAPID: NEGATIVE
HCT VFR BLD AUTO: 40.4 % (ref 36–46)
HGB BLD-MCNC: 13.5 G/DL (ref 12–16)
HOLD SPECIMEN: NORMAL
HOLD SPECIMEN: NORMAL
IMM GRANULOCYTES # BLD AUTO: 0.03 X10*3/UL (ref 0–0.7)
IMM GRANULOCYTES NFR BLD AUTO: 0.3 % (ref 0–0.9)
KETONES UR STRIP.AUTO-MCNC: NEGATIVE MG/DL
LEUKOCYTE ESTERASE UR QL STRIP.AUTO: ABNORMAL
LIPASE SERPL-CCNC: 17 U/L (ref 9–82)
LYMPHOCYTES # BLD AUTO: 2.09 X10*3/UL (ref 1.2–4.8)
LYMPHOCYTES NFR BLD AUTO: 24.3 %
MCH RBC QN AUTO: 30.8 PG (ref 26–34)
MCHC RBC AUTO-ENTMCNC: 33.4 G/DL (ref 32–36)
MCV RBC AUTO: 92 FL (ref 80–100)
MONOCYTES # BLD AUTO: 0.77 X10*3/UL (ref 0.1–1)
MONOCYTES NFR BLD AUTO: 8.9 %
MUCOUS THREADS #/AREA URNS AUTO: ABNORMAL /LPF
NEUTROPHILS # BLD AUTO: 5.43 X10*3/UL (ref 1.2–7.7)
NEUTROPHILS NFR BLD AUTO: 63.1 %
NITRITE UR QL STRIP.AUTO: NEGATIVE
NRBC BLD-RTO: 0 /100 WBCS (ref 0–0)
PH UR STRIP.AUTO: 5.5 [PH]
PLATELET # BLD AUTO: 264 X10*3/UL (ref 150–450)
POTASSIUM SERPL-SCNC: 3.7 MMOL/L (ref 3.5–5.3)
PROT SERPL-MCNC: 7.1 G/DL (ref 6.4–8.2)
PROT UR STRIP.AUTO-MCNC: NEGATIVE MG/DL
RBC # BLD AUTO: 4.39 X10*6/UL (ref 4–5.2)
RBC # UR STRIP.AUTO: ABNORMAL /UL
RBC #/AREA URNS AUTO: ABNORMAL /HPF
SODIUM SERPL-SCNC: 138 MMOL/L (ref 136–145)
SP GR UR STRIP.AUTO: 1.02
SQUAMOUS #/AREA URNS AUTO: ABNORMAL /HPF
UROBILINOGEN UR STRIP.AUTO-MCNC: NORMAL MG/DL
WBC # BLD AUTO: 8.6 X10*3/UL (ref 4.4–11.3)
WBC #/AREA URNS AUTO: ABNORMAL /HPF

## 2025-01-31 PROCEDURE — 99284 EMERGENCY DEPT VISIT MOD MDM: CPT | Mod: 25 | Performed by: EMERGENCY MEDICINE

## 2025-01-31 PROCEDURE — 81025 URINE PREGNANCY TEST: CPT | Performed by: EMERGENCY MEDICINE

## 2025-01-31 PROCEDURE — 81001 URINALYSIS AUTO W/SCOPE: CPT | Performed by: EMERGENCY MEDICINE

## 2025-01-31 PROCEDURE — 96375 TX/PRO/DX INJ NEW DRUG ADDON: CPT

## 2025-01-31 PROCEDURE — 85025 COMPLETE CBC W/AUTO DIFF WBC: CPT | Performed by: EMERGENCY MEDICINE

## 2025-01-31 PROCEDURE — 74176 CT ABD & PELVIS W/O CONTRAST: CPT

## 2025-01-31 PROCEDURE — 36415 COLL VENOUS BLD VENIPUNCTURE: CPT | Performed by: EMERGENCY MEDICINE

## 2025-01-31 PROCEDURE — 80053 COMPREHEN METABOLIC PANEL: CPT | Performed by: EMERGENCY MEDICINE

## 2025-01-31 PROCEDURE — 2500000004 HC RX 250 GENERAL PHARMACY W/ HCPCS (ALT 636 FOR OP/ED): Performed by: EMERGENCY MEDICINE

## 2025-01-31 PROCEDURE — 2500000001 HC RX 250 WO HCPCS SELF ADMINISTERED DRUGS (ALT 637 FOR MEDICARE OP): Performed by: EMERGENCY MEDICINE

## 2025-01-31 PROCEDURE — 83690 ASSAY OF LIPASE: CPT | Performed by: EMERGENCY MEDICINE

## 2025-01-31 PROCEDURE — 96374 THER/PROPH/DIAG INJ IV PUSH: CPT

## 2025-01-31 PROCEDURE — 74176 CT ABD & PELVIS W/O CONTRAST: CPT | Mod: FOREIGN READ | Performed by: RADIOLOGY

## 2025-01-31 PROCEDURE — 87086 URINE CULTURE/COLONY COUNT: CPT | Mod: WESLAB | Performed by: EMERGENCY MEDICINE

## 2025-01-31 RX ORDER — ONDANSETRON HYDROCHLORIDE 2 MG/ML
4 INJECTION, SOLUTION INTRAVENOUS ONCE
Status: COMPLETED | OUTPATIENT
Start: 2025-01-31 | End: 2025-01-31

## 2025-01-31 RX ORDER — KETOROLAC TROMETHAMINE 15 MG/ML
15 INJECTION, SOLUTION INTRAMUSCULAR; INTRAVENOUS ONCE
Status: COMPLETED | OUTPATIENT
Start: 2025-01-31 | End: 2025-01-31

## 2025-01-31 RX ORDER — KETOROLAC TROMETHAMINE 10 MG/1
10 TABLET, FILM COATED ORAL EVERY 6 HOURS PRN
Qty: 20 TABLET | Refills: 0 | Status: SHIPPED | OUTPATIENT
Start: 2025-01-31 | End: 2025-02-05

## 2025-01-31 RX ORDER — CEPHALEXIN 500 MG/1
500 CAPSULE ORAL 2 TIMES DAILY
Qty: 14 CAPSULE | Refills: 0 | Status: SHIPPED | OUTPATIENT
Start: 2025-01-31 | End: 2025-02-07

## 2025-01-31 RX ORDER — IBUPROFEN 200 MG
800 TABLET ORAL EVERY 6 HOURS PRN
COMMUNITY

## 2025-01-31 RX ORDER — ASCORBIC ACID 500 MG
500 TABLET ORAL DAILY
COMMUNITY

## 2025-01-31 RX ORDER — ACETAMINOPHEN 325 MG/1
975 TABLET ORAL ONCE
Status: COMPLETED | OUTPATIENT
Start: 2025-01-31 | End: 2025-01-31

## 2025-01-31 RX ORDER — ONDANSETRON 4 MG/1
4 TABLET, FILM COATED ORAL EVERY 6 HOURS
Qty: 12 TABLET | Refills: 0 | Status: SHIPPED | OUTPATIENT
Start: 2025-01-31 | End: 2025-02-03

## 2025-01-31 RX ORDER — TAMSULOSIN HYDROCHLORIDE 0.4 MG/1
0.4 CAPSULE ORAL DAILY
Qty: 5 CAPSULE | Refills: 0 | Status: SHIPPED | OUTPATIENT
Start: 2025-01-31 | End: 2025-02-05

## 2025-01-31 RX ORDER — FAMOTIDINE 10 MG/ML
20 INJECTION, SOLUTION INTRAVENOUS ONCE
Status: COMPLETED | OUTPATIENT
Start: 2025-01-31 | End: 2025-01-31

## 2025-01-31 RX ORDER — CEFTRIAXONE 1 G/50ML
1 INJECTION, SOLUTION INTRAVENOUS ONCE
Status: DISCONTINUED | OUTPATIENT
Start: 2025-01-31 | End: 2025-01-31 | Stop reason: HOSPADM

## 2025-01-31 RX ADMIN — FAMOTIDINE 20 MG: 10 INJECTION, SOLUTION INTRAVENOUS at 08:19

## 2025-01-31 RX ADMIN — ACETAMINOPHEN 975 MG: 325 TABLET ORAL at 09:39

## 2025-01-31 RX ADMIN — KETOROLAC TROMETHAMINE 15 MG: 15 INJECTION, SOLUTION INTRAMUSCULAR; INTRAVENOUS at 08:19

## 2025-01-31 RX ADMIN — ONDANSETRON 4 MG: 2 INJECTION INTRAMUSCULAR; INTRAVENOUS at 08:18

## 2025-01-31 ASSESSMENT — PAIN - FUNCTIONAL ASSESSMENT: PAIN_FUNCTIONAL_ASSESSMENT: 0-10

## 2025-01-31 ASSESSMENT — PAIN DESCRIPTION - ORIENTATION: ORIENTATION: RIGHT

## 2025-01-31 ASSESSMENT — PAIN SCALES - GENERAL: PAINLEVEL_OUTOF10: 8

## 2025-01-31 ASSESSMENT — PAIN DESCRIPTION - PAIN TYPE: TYPE: ACUTE PAIN

## 2025-01-31 ASSESSMENT — PAIN DESCRIPTION - LOCATION: LOCATION: ABDOMEN

## 2025-01-31 NOTE — ED PROVIDER NOTES
HPI   Chief Complaint   Patient presents with    Flank Pain       HPI  Patient is a 29-year-old female presenting to ER for evaluation of right-sided flank pain that started approximately 1 hour prior to arrival.  Patient states the pain is in her mid abdomen and wraps around her right flank into her back.  States that the pain is sharp and shooting.  She states she has felt this pain before with kidney stones and with an appendix infection, does report history of an appendectomy.  She states she is nauseous but has not vomited.  Denies urinary symptoms.  Denies fever or chills.  Denies chest pain or shortness of breath.  Otherwise has no acute complaints.      Patient History   Past Medical History:   Diagnosis Date    Abnormal uterine bleeding 10/01/2024    Cervical intraepithelial neoplasia grade 1 10/01/2024    Class 1 obesity 03/28/2022    Cough, unspecified 11/05/2022    Depression 2008    Irregular menstrual cycle 03/28/2022    Pain in joint of right shoulder 08/17/2022    Pain of left eye 04/18/2023    Shortness of breath 10/07/2022     Past Surgical History:   Procedure Laterality Date    APPENDECTOMY  2018     Family History   Problem Relation Name Age of Onset    Allergies Mother Lorena Trevino     Arthritis Mother Lorena Trevino     Depression Mother Lorena Trevino     Hypertension Mother Lorena Trevino     Allergies Father Masoud Trevino     Cancer Maternal Grandmother Saige Ceballos         ?unknown source    Arthritis Maternal Grandmother Saige Tucker     Heart attack Maternal Grandfather Davey Ceballos     Allergies Maternal Grandfather Davey Ceballos     Heart disease Maternal Grandfather Davey Ceballos     Hypertension Maternal Grandfather Davey Ceballos     Alcohol abuse Mother's Brother Teodoro Ceballos     Asthma Father's Brother Emerson Ceballos     Depression Brother Helio Trevino     Hearing loss Mother's Sister Jacque Dinero      Social History     Tobacco Use    Smoking status: Never     Passive exposure: Never    Smokeless tobacco: Never     Tobacco comments:     never   Vaping Use    Vaping status: Never Used   Substance Use Topics    Alcohol use: Yes     Alcohol/week: 1.0 standard drink of alcohol     Types: 1 Glasses of wine per week     Comment: socially    Drug use: Never       Physical Exam   ED Triage Vitals [01/31/25 0759]   Temperature Heart Rate Respirations BP   35.9 °C (96.6 °F) 87 20 (!) 138/91      Pulse Ox Temp Source Heart Rate Source Patient Position   100 % Temporal -- --      BP Location FiO2 (%)     -- --       Physical Exam  Vitals and nursing note reviewed.   Constitutional:       General: She is not in acute distress.     Appearance: She is well-developed.   HENT:      Head: Normocephalic and atraumatic.   Eyes:      Conjunctiva/sclera: Conjunctivae normal.   Cardiovascular:      Rate and Rhythm: Normal rate and regular rhythm.      Heart sounds: No murmur heard.  Pulmonary:      Effort: Pulmonary effort is normal. No respiratory distress.      Breath sounds: Normal breath sounds.   Abdominal:      Palpations: Abdomen is soft.      Tenderness: There is no abdominal tenderness.      Comments: Nontender nondistended abdomen, no flank tenderness bilaterally   Musculoskeletal:         General: No swelling.      Cervical back: Neck supple.   Skin:     General: Skin is warm and dry.      Capillary Refill: Capillary refill takes less than 2 seconds.   Neurological:      Mental Status: She is alert.   Psychiatric:         Mood and Affect: Mood normal.           ED Course & MDM   ED Course as of 01/31/25 1828 Fri Jan 31, 2025   1006 I did speak with the urologist Dr. Parnell who feels this patient is appropriate for outpatient therapy at this time.  She recommends Flomax, Keflex for treatment of possible urinary tract infection and pain control and she will follow-up with her in the office in the next few days. [JJ]      ED Course User Index  [JJ] Leeanne Clark PA-C         Diagnoses as of 01/31/25 1828   Flank pain   Right lower  quadrant abdominal pain   Nausea without vomiting   Diastolic hypertension   Ureterolithiasis                 No data recorded     [place] Coma Scale Score: 15 (01/31/25 0830 : Rolanda Guerrero RN)                           Medical Decision Making  Parts of this chart have been completed using voice recognition software. Please excuse any errors of transcription.  My thought process and reason for plan has been formulated from the time that I saw the patient until the time of disposition and is not specific to one specific moment during their visit and furthermore my MDM encompasses this entire chart and not only this text box.      HPI: Detailed above.    Exam: A medically appropriate exam performed, outlined above, given the known history and presentation.    History obtained from: Patient    EKG: Not warranted    Social Determinants of Health considered during this visit: Lives independently    Medications given during visit:  Medications   acetaminophen (Tylenol) tablet 975 mg (975 mg oral Given 1/31/25 0939)   ketorolac (Toradol) injection 15 mg (15 mg intravenous Given 1/31/25 0819)   ondansetron (Zofran) injection 4 mg (4 mg intravenous Given 1/31/25 0818)   famotidine PF (Pepcid) injection 20 mg (20 mg intravenous Given 1/31/25 0819)        Diagnostic/tests  Labs Reviewed   COMPREHENSIVE METABOLIC PANEL - Abnormal       Result Value    Glucose 124 (*)     Sodium 138      Potassium 3.7      Chloride 108 (*)     Bicarbonate 20 (*)     Anion Gap 14      Urea Nitrogen 17      Creatinine 0.78      eGFR >90      Calcium 8.8      Albumin 4.2      Alkaline Phosphatase 37      Total Protein 7.1      AST 11      Bilirubin, Total 0.7      ALT 22     URINALYSIS WITH REFLEX CULTURE AND MICROSCOPIC - Abnormal    Color, Urine Light-Yellow      Appearance, Urine Clear      Specific Gravity, Urine 1.023      pH, Urine 5.5      Protein, Urine NEGATIVE      Glucose, Urine Normal      Blood, Urine 0.2 (2+) (*)     Ketones, Urine  NEGATIVE      Bilirubin, Urine NEGATIVE      Urobilinogen, Urine Normal      Nitrite, Urine NEGATIVE      Leukocyte Esterase, Urine 500 [place]/uL (*)    MICROSCOPIC ONLY, URINE - Abnormal    WBC, Urine 21-50 (*)     RBC, Urine 11-20 (*)     Squamous Epithelial Cells, Urine 1-9 (SPARSE)      Bacteria, Urine 1+ (*)     Mucus, Urine FEW     HCG, URINE, QUALITATIVE - Normal    HCG, Urine NEGATIVE     LIPASE - Normal    Lipase 17      Narrative:     Venipuncture immediately after or during the administration of Metamizole may lead to falsely low results. Testing should be performed immediately prior to Metamizole dosing.   URINE CULTURE   CBC WITH AUTO DIFFERENTIAL    WBC 8.6      nRBC 0.0      RBC 4.39      Hemoglobin 13.5      Hematocrit 40.4      MCV 92      MCH 30.8      MCHC 33.4      RDW 13.1      Platelets 264      Neutrophils % 63.1      Immature Granulocytes %, Automated 0.3      Lymphocytes % 24.3      Monocytes % 8.9      Eosinophils % 2.9      Basophils % 0.5      Neutrophils Absolute 5.43      Immature Granulocytes Absolute, Automated 0.03      Lymphocytes Absolute 2.09      Monocytes Absolute 0.77      Eosinophils Absolute 0.25      Basophils Absolute 0.04     URINALYSIS WITH REFLEX CULTURE AND MICROSCOPIC    Narrative:     The following orders were created for panel order Urinalysis with Reflex Culture and Microscopic.  Procedure                               Abnormality         Status                     ---------                               -----------         ------                     Urinalysis with Reflex C...[[id number]]  Abnormal            Final result               Extra Urine Gray Tube[[id number]]                            In process                   Please view results for these tests on the individual orders.   EXTRA URINE GRAY TUBE      CT abdomen pelvis wo IV contrast   Final Result   Mild right hydroureteronephrosis with a 2 mm stone right UVJ.   Horseshoe kidney.  Separate 1 to 2 mm  nonobstructing stones within the   lower left and lower right renal poles.   Signed by Alo Persaud MD           Considerations/further MDM:  Patient is a 29-year-old female presenting for evaluation of right flank pain, nausea    Differential diagnosis associated with the patient presentation includes: Nephroureterolithiasis, cholecystitis, pyelonephritis, UTI, musculoskeletal pain    Patient is awake alert in no apparent distress and is nontoxic-appearing on exam.  Patient has no CVA tenderness to palpation.  Vital signs are within normal limits during the visit and the patient is afebrile.  She is not tachycardic during the visit.  She has no evidence of sepsis or toxicity.  Provided Pepcid, Toradol, Zofran and Tylenol for symptom relief.  Laboratory workup is suggestive of possible urinary tract infection but without evidence of leukocytosis or acute kidney injury.  CT abdomen pelvis was pursued with a 2 mm stone in the right UPJ with mild right hydroureteronephrosis.  Given the patient does have a possible UTI and stone did discuss plan of care with urology.  Urology does not feel this patient requires admission at this time.  Suggest close follow-up with treatment with Flomax, Keflex, pain relief and Zofran for therapy.  I did discuss this plan of care with the patient and she is agreeable.  Strict return precautions were discussed including development of fever, worsening flank pain, lightheadedness or dizziness or intractable nausea and vomiting.  Referral to urology provided.  Rx for Flomax, Keflex, Zofran and Toradol provided.  Released in good condition.  States her understanding of the treatment plan.      Procedure  Procedures     Leaenne Clark PA-C  01/31/25 2556

## 2025-01-31 NOTE — ED TRIAGE NOTES
Patient arrived to the ED from home via personal vehicle accompanied by wife with a chief complaint of right flank pain. Patient reports it started at 0718, accompanied by nausea, chills, sweating. Patient ambulated independently into triage, A&Ox4.

## 2025-01-31 NOTE — PROGRESS NOTES
"Pharmacy Medication History Review    Chichi Trevino \"Katie" is a 29 y.o. female. Pharmacy reviewed the patient's mejjz-al-icmkssgdg medications and allergies for accuracy.    Medications ADDED:  Prenatal  Vitamin C 500mg  Vitamin D3 oral  Ibuprofen 200mg  Medications CHANGED:  Albuterol HFA - not taking  Flonase - not taking  Azelastine nasal - not taking  Medications REMOVED:   None      The list below reflects the updated PTA list. Comments regarding how patient may be taking medications differently can be found in the Admit Orders Activity  Prior to Admission Medications   Prescriptions Last Dose Informant   ascorbic acid (Vitamin C) 500 mg tablet  Self   Sig: Take 1 tablet (500 mg) by mouth once daily.   cholecalciferol, vitamin D3, (VITAMIN D3 ORAL)  Self   Sig: Take 1 each by mouth once daily.   ibuprofen 200 mg tablet Past Month Self   Sig: Take 4 tablets (800 mg) by mouth every 6 hours if needed for mild pain (1 - 3).   prenatal vit37/iron/folic acid (PRENATA ORAL)  Self   Sig: Take 1 each by mouth once daily.      Facility-Administered Medications: None        The list below reflects the updated allergy list. Please review each documented allergy for additional clarification and justification.  Allergies  Reviewed by Yazmin Reilly CPhT on 1/31/2025   No Known Allergies         Pharmacy has been updated to [facility].    Sources used to complete the med history include dispense history, PTA medication list, patient interview. Patient is a good historian.    Below are additional concerns with the patient's PTA list.  None     Yazmin Reilly CPhT-Adv  Please reach out via [facility] Secure Chat for questions    "

## 2025-01-31 NOTE — Clinical Note
Chichi Trevino was seen and treated in our emergency department on 1/31/2025.  She may return to work on 02/01/2025.       If you have any questions or concerns, please don't hesitate to call.      Leeanne Clark PA-C

## 2025-01-31 NOTE — DISCHARGE INSTRUCTIONS
You have a 2 mm kidney stone and a possible urinary tract infection.  It is very important that you monitor your symptoms closely at home and treat the possible UTI with Keflex twice daily for 7 days, the kidney stone with Flomax, Toradol and Tylenol on rotation for pain relief and Zofran as needed for nausea and vomiting.  It is very important that you call the urologist office today and schedule an appointment for early next week for further management of your symptoms.    It is important to remember that your care does not end here and you must continue to monitor your condition closely. Please return to the emergency department for any worsening or concerning signs or symptoms as directed by our conversations and the discharge instructions. If you do not have a doctor please contact the referral number on your discharge instructions. Please contact any physician specialists provided in your discharge notes as it is very important to follow up with them regarding your condition. If you are unable to reach the physicians provided, please come back to the Emergency Department at any time.

## 2025-01-31 NOTE — PROGRESS NOTES
Attestation/Supervisory note for HERBERTH Clark      The patient is a 29-year-old female presenting to the emergency department for evaluation of right-sided flank pain and right lower quadrant abdominal pain.  She states that the symptoms started proxy 1 hour prior to arrival.  It is a constant aching pain.  She states that there is a sharp pain that radiates from the flank to the right lower quadrant.  No better or worse.  She reports nausea without vomiting.  No recent injury or trauma.  No fever or chills.  No focal weakness or numbness.  She denies any headache or visual changes.  No chest pain or shortness of breath.  No urinary complaints.  No vaginal discharge.  No diarrhea or constipation.  She has nausea without vomiting.  No weakness or numbness.  She has had a previous appendectomy and reports that she does have a history of similar symptoms in the past with ureteral lithiasis and ovarian cysts.  She has not taken anything for symptom relief prior to arrival.  All pertinent positives and negatives are recorded above.  All other systems reviewed and otherwise negative.  Vital signs with diastolic hypertension but otherwise within normal limits.  Physical exam with a well-nourished well-developed female in no acute distress.  HEENT exam within normal limits.  She has no evidence of airway compromise or respiratory distress.  Abdominal exam with mild tenderness to palpation of the right lower quadrant.  No rebound or guarding.  No palpable masses.  She does have right sided CVA tenderness with percussion.  Pulses are equal bilaterally.  She does not have any gross motor, neurologic or vascular episodes on exam.  No focal midline neck or back pain with palpation.  No step-offs.  She is able to walk and stand without difficulty.  She is able to converse without difficulty.      Oral acetaminophen, IV Zofran, IV Pepcid and IV Toradol ordered with improvement in her symptoms      Diagnostic labs with a mild  electrolyte imbalance and evidence of a urinary tract infection but otherwise unremarkable.      IV Rocephin was ordered.      CT abdomen pelvis wo IV contrast   Final Result   Mild right hydroureteronephrosis with a 2 mm stone right UVJ.   Horseshoe kidney.  Separate 1 to 2 mm nonobstructing stones within the   lower left and lower right renal poles.   Signed by Alo Persaud MD           The patient does not have any evidence of hemodynamic instability.  She is well-perfused on exam and has no evidence of sepsis.  She does have evidence of a mild electrolyte imbalance and evidence of urinary tract infection on diagnostic labs but the remainder of the diagnostic labs were unremarkable.  No evidence of renal insufficiency.  IV Rocephin was ordered.  The CT abdomen pelvis shows a 2 mm right UVJ stone with mild right-sided hydroureteronephrosis.  The case was discussed with urology, Dr. Parnell, and she did recommend discharge to home with outpatient antibiotics and outpatient follow-up in her office.  She did not feel that the patient need to be admitted but she was aware that the patient did have evidence of a possible urinary tract infection.      The patient was released in good condition with a prescription for Keflex, Toradol, Zofran and Flomax.  She will follow-up with her primary care physician within 1 to 2 days for further management of her current symptoms and repeat check of her blood pressure and review of the urine culture results.  She will follow-up with urology within 2 to 3 days for further management of her current symptoms.  She will return to the emergency department sooner with worsening of symptoms or onset of new symptoms      Impression/diagnosis:  Right-sided flank pain  Right lower quadrant abdominal pain  Diastolic hypertension  Nausea without vomiting  Right UVJ stone with mild right-sided hydroureteronephrosis  Acute lower urinary tract infection      I personally saw the patient and  made/approve the management plan and take responsibility for the patient management.      I independently interpreted the following study (S) diagnostic labs      I personally discussed the patient's management with the patient      I reviewed the results of the diagnostic labs and diagnostic imaging.  Formal radiology read was completed by the radiologist.      Briana Nieto MD

## 2025-02-01 LAB — BACTERIA UR CULT: NORMAL

## 2025-02-03 LAB
COMMENTS - MP RESULT TYPE: NORMAL
SCAN RESULT: NORMAL

## 2025-02-05 NOTE — PROGRESS NOTES
[facility] Donor Sperm IUI Checklist    Age: 29 y.o.    Provider: Rose Catalan CNP  Primary RN: Gogo Mcintyre  Reasons for Treatment: Male infertility  Last BMI  25 : 34.01 kg/m²       Past Medical History:   Diagnosis Date    Abnormal uterine bleeding 10/01/2024    Cervical intraepithelial neoplasia grade 1 10/01/2024    Class 1 obesity 2022    Cough, unspecified 2022    Depression     Irregular menstrual cycle 2022    Pain in joint of right shoulder 2022    Pain of left eye 2023    Shortness of breath 10/07/2022       Date Done Consultation Results/Comments   24 Medication Protocol Letrozole 2.5 mg days 3-7/monitoring/HCG Trigger/Donor IUI + LP Prometrium 100 mg BID PV to start 3 days after IUI x 3 cycles  If no success- re-evaluate    Authorization to Share []       Procedure Order Placed []        Donor Insemination Authorization- Single or Couple (Yearly) Received and in chart: {IMMANUEL Yes (Name):84006}    Andrology Donor Sperm Packet (ID REQ) (Each time sperm is ordered) []    Reprotech Gamete/Tissue- Single (ADULT) Packet- Patient (Each time sperm is ordered) Received and in chart: {IMMANUEL Yes (Name):67289}    Psych Consult Okay to proceed? {IMMANUEL Yes, No, N/A:16727}    MFM Consult Okay to proceed? {IMMANUEL Yes, No, N/A:36475}    Genetics Consult []        GYN Waiver []       Other    Date Done Female Labs Results/Comments   2024 T&S (Q 1 Year) ABO: A  Rh: POS  Antibody: NEG   2024 Hep B sAg Nonreactive   2024 Hep C AB Nonreactive   2024 HIV Nonreactive   2024 Syphilis Nonreactive   2024 GC/CT GC: Negative  CT: Negative   2024 CMV Nonreactive; <8.0   2024 Rubella (Q 5 Years) Positive   2024 Varicella (Q 5 Years) Positive (A)   2024 TSH 1.50 (Ref range: 0.44 - 3.98 mIU/L)    HgbA1C    2024 AMH 3.268    Carrier Screening Myriad 2bP:   {IMMANUEL Carrier Screenin}  {IMMANUEL Carrier Screening  Neg/Pos:80733}    Tubal Eval/Uterine Cavity Eval Pelvic US:   HSG: FL HYSTEROSALPINGOGRAM (1/16/2025):   SIS:   Hyster:    Date Done Male Labs Results/Comments    Donor Type {IMMANUEL Male Donor Type:89074}    Donor Cleared? []    Known Donor FDA Eligible: {YES/NO/NA:01015}  FDA Waiver: []    Sperm Bank {IMMANUEL Donor Sperm Bank:55129}   Date Done Miscellaneous Results/Comments    FDA Waiver Signed and in chart (if ineligible known donor): {UH IMMANUEL Yes(details)/NA:23278}    BMI Checklist  BMI > 40 or < 18 Added to chart:   {IMMANUEL BMI Checklist Yes/No:78497}    >= 45 Checklist  Added to chart:   {IMMANUEL 45 and Older Checklist Yes/No:40154}     MD Completion:  Ectopic Risk: {YES/NO/NA:83385}  Medically Complex: {YES/NO/NA:04456}

## 2025-02-14 ENCOUNTER — TELEMEDICINE CLINICAL SUPPORT (OUTPATIENT)
Dept: GENETICS | Facility: CLINIC | Age: 30
End: 2025-02-14
Payer: COMMERCIAL

## 2025-02-14 DIAGNOSIS — Z14.8 GENETIC CARRIER: ICD-10-CM

## 2025-02-14 DIAGNOSIS — Z31.89 ENCOUNTER FOR FERTILITY PLANNING: ICD-10-CM

## 2025-02-14 DIAGNOSIS — Z31.5 ENCOUNTER FOR PROCREATIVE GENETIC COUNSELING: ICD-10-CM

## 2025-02-14 NOTE — Clinical Note
Hi!  Patient has a few donors she is interested in using though DNA SEQ. Have you used their Gene Matching program previously? Emailing you some info on it too.  Zara

## 2025-02-14 NOTE — PROGRESS NOTES
Ms. Chichi Trevino returns to genetic counseling today to review the results of her carrier screening, as well as to discuss potential donors that she would like to proceed with using.    CMV status: negative    Prior carrier screenin-gene carrier screening to match the ACMG guidelines for general population screening through [employer] was ordered at initial genetic counseling appointment on 2025. She is positive for one condition:      POTENTIAL DONOR(S):  Patient sent [employer] message with the following donor information     There is no publicly available information on the bank websites ([employer] OR [employer]) on genetic information for these 5 donors.    COUNSELING:    The following information was discussed with your patient:    The technology, benefits, and limitations of carrier screening were reviewed, including that it does not screen for all genetic conditions. Variants of unknown significance are not reported. Actual reproductive risks may be higher than quoted due to this fact, as well as the potential for some conditions to have digenic inheritance. For the majority of the conditions tested, which are autosomal recessive, if a partner of a pregnancy is also a carrier, the chance to have an affected child is 1 in 4 (or 25%). On the other hand, for the autosomal recessive conditions tested, the chance to have a child with the condition is considerably lower if only one parent is a carrier for the condition. Normal carrier screening reduces the likelihood that a person is a carrier, but does not eliminate it. In most cases, carriers of an autosomal recessive genetic disorder are not expected to have symptoms.   POLG-related conditions. 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 Based on general population risk, there is a risk of 1 in 50 that a random anonymous donor is a carrier of this  "condition, too. There is therefore currently a 1 in 200 risk of having a future affected child. Per the [employer] report, \"POLG?Related Disorders are a group of related inherited disorders that affect the muscles, nerves, and brain. The POLG?Related Disorders include: Alpers? Huttenlocher Syndrome (AHS); Ataxia Neuropathy Spectrum (ANS); Childhood Myocerebrohepatopathy Spectrum (MCHS); Myoclonic Epilepsy Myopathy Sensory Ataxia (MEMSA); and Progressive External Ophthalmoplegia. Age at the start of symptoms varies from infancy to adulthood.\" Symptoms associated with these conditions include seizures, developmental delays, balance/movement disorders, vision/hearing loss, and neuropathy, depending on the specific condition that the mutation in the POLG gene causes in the individual. Specific diagnosis is dependent on both parents' mutations, and therefore difficult to predict in this theoretical situation.  Since none of the donors that the patient is interested in using have publicly available information on their genetic status, we were unable to clear donors for use today. I will reach out to the donor zhu to request additional information.   We discussed that [employer] has a \"genetic matching program.\" If the couple chooses to move forward with a donor from this bank, we discussed that they offer single gene matching, but only with a paid order. So, POLG analysis will be unavailable until after an order is placed. If the selected donor is identified as being a carrier of POLG related conditions, then a new donor can be selected for no additional charge. I will inquire with the fertility team how they would like to proceed from an \"approval\" standpoint for donors through [email or web address] and update Ms. Trevino as soon as possible.    DISPOSITION:  The patient stated that she understood the above information.   [employer] donor matching program available on purchased sperm, and donor approval " process would come from the bank specifically.   I will see if I can obtain any genetic test reports available from [employer] and update Ms. Trevino on if donor [id number] has had POLG carrier screening or not.    Thank you for allowing us to participate in the care of your patient.  Should you or your patient have any questions, please do not hesitate to contact our office at [phone].    Total time spent on day of encounter: 25 minutes (15 minutes with patient, 10 minutes on pre/post patient care activities, including documentation).    Sincerely,   Zara Bran MS, [facility]  Licensed and Certified Genetic Counselor

## 2025-03-17 ENCOUNTER — TELEMEDICINE (OUTPATIENT)
Dept: ENDOCRINOLOGY | Facility: CLINIC | Age: 30
End: 2025-03-17
Payer: COMMERCIAL

## 2025-03-17 DIAGNOSIS — Z31.81 ENCOUNTER FOR MALE FACTOR INFERTILITY IN FEMALE PATIENT: Primary | ICD-10-CM

## 2025-03-17 DIAGNOSIS — N97.8 ENCOUNTER FOR MALE FACTOR INFERTILITY IN FEMALE PATIENT: Primary | ICD-10-CM

## 2025-03-17 PROCEDURE — 1036F TOBACCO NON-USER: CPT

## 2025-03-17 PROCEDURE — 99214 OFFICE O/P EST MOD 30 MIN: CPT

## 2025-03-17 NOTE — Clinical Note
Hi, can we touch base Monday the 24th to get the BP ready for Mray- I filled in everything on the BP except 2 highlighted red areas that need Engaged MD forms sent to her so she can order donor sperm. Once that BP is complete I will put in my visit from today, Friday the 21st, Rose ROACH :)

## 2025-03-17 NOTE — PROGRESS NOTES
"  Virtual or Telephone Consent: An interactive audio and video telecommunication system which permits real time communications between the patient (at the originating site) and provider (at the distant site) was utilized to provide this telehealth service  MD reviewed, Authorization status not noted.    Follow Up Visit HPI/Boarding Pass Visit    Patient is a 29 y.o.  female with male factor infertility, in a same sex female relationship  to wife Anny, C/O heavier menses and dysmenorrhea x 6-7 yrs, presenting today for follow up visit to discuss test results and next steps for Donor/IUI.    Psych Eval with DR. Martinez 2025: Impression: It is my clinical opinion that Mary Trevino and Anny Mobley are able to give informed consent and have carefully considered the psychosocial issues inherent in third party reproduction.      Genetics: 3-:  Yordy Zuñiga,     thank you for that news. Unfortunately, he no longer has IUI. Rose wanted us to continue our search as she isn't fond of the [employer] method so i have a new [employer] profile for review.      [employer] Donor #      We do already know he is a carrier for cystic fibrosis/CFTR-related conditions. If he is approved, what are the risks with his condition?   Attachments   .png  2025  Zara Bran, [facility] to Chichi Trevino \"Mary\"       3/20/25  9:13 AM    Yordy Hernandez,  Apologies for the confusion with the different zhu.     This donor is a carrier of cystic fibrosis, but you are not. So, reproductive risk is 1 in 9,600 to have an affected child with this donor (using the residual risk that you are a carrier given your negative screening).      Carriers of cystic fibrosis do not typically have any health risks. There is a slight increased relative risk that carriers of CF develop chronic pancreatitis in adulthood, however, the absolute risk of this happening is <1%. This is not something that typically " causes any concern.      This donor was screened for POLG conditions. So, reproductive risk is 1 in 1,280 to have an affected child with this donor (using the residual risk that the donor is a carrier given the negative screening).      All in all, this donor is APPROVED from a genetic standpoint. Let me know if you have any questions about this information by replying to this message-- we can always set up a phone call to discuss in further detail if you'd like!     Best,  Zara   [phone]    Anonymous Sperm Donor  with [employer] is CMV Negative. He is a carrier for Cystic Fibrosis, patient is negative.     Patient: CMV IgG Negative/IgM Negative    Prior Labs  Lab Results    Date Done      AMH: 3.268 (Ref range: 0.176 - 11.705 ng/mL) 12/16/2024   TSH: 1.50 (Ref range: 0.44 - 3.98 mIU/L) 12/16/2024   PRL: 11.1 (Ref range: 4.8 - 23.3 NG/ML) 4/13/2021   Testosterone: No results found for requested labs within last 1825 days. No results found for requested labs within last 1825 days.   DHEAS: 272.5  Reference range: 98.8 to 340.0   4/13/2021   FSH: 8.0 (Ref range: MU/ML) 4/13/2021   17 OHP: No results found for requested labs within last 1825 days. No results found for requested labs within last 1825 days.   HgbA1c: 4.9 (Ref range: 4.0 - 6.0 %) 4/13/2021   CMV IgG: negative  CMV IgM: negative 12-   Hepatitis B surface antigen: Nonreactive (Ref range: Nonreactive) 12/16/2024   Hepatitis C antibody: Nonreactive (Ref range: Nonreactive) 12/16/2024   HIV ½ Antigen Antibody screen with reflex: Nonreactive (Ref range: Nonreactive) 12/16/2024   Syphilis screening with reflex: Negative 12/16/2024   GC: Negative (Ref range: Negative) 12/16/2024   CT: Negative (Ref range: Negative) 12/16/2024   Type and Screen: A 12/16/2024   Rh: POS 12/16/2024   Antibody: NEG (Ref range: ) 12-   Rubella: Positive (Ref range: Negative) 12/16/2024   Varicella: Positive (A; Ref range: Negative) 12/16/2024   Hemoglobin: No  results found for requested labs within last 1825 days. No results found for requested labs within last 1825 days.   Hematocrit: No results found for requested labs within last 1825 days. No results found for requested labs within last 1825 days.   Creatinine: 0.78 (Ref range: 0.50 - 1.05 mg/dL) 2025   AST:11 (Ref range: 9 - 39 U/L) 2025   ALT:22 (Ref range: 7 - 45 U/L): 2025        Genetics 2025: [address] Gene Screen with [facility]: Carrier for POLG-related disorders, [employer] Donor 567 is negative for this condition.     Hysterosalpingogram: FL HYSTEROSALPINGOGRAM (2025): normal  Saline Infused Sonography: no  GYN Pelvic Ultrasound: US PELVIS TRANSVAGINAL (2024): Unremarkable pelvic survey with normal appearing uterus and ovaries.     Treatment to date:   Fertility Cycles       Cycle Name Treatment Start Date Type Outcome    Donor IUI #1  IUI Active          Relationship Status:   4 weeks, together 4 yrs- Anny     Have you ever been pregnant?  no  How many times have you been pregnant?    Have you ever had a miscarriage?    How many times have you had a miscarriage?       OB Hx:         GYN HISTORY   Have you ever been diagnosed with a sexually transmitted disease?  no  Have you ever had Pelvic Inflammatory Disease?  no  Have you had an abnormal PAP smear?  Yes- repeat pap test was normal  Date & Result of last PAP smear:          Lab Results   Component Value Date     FINALINTERP   10/28/2024             A. THINPREP PAP CERVIX, SCREENING -      Specimen Adequacy  Satisfactory for evaluation; absence of endocervical/transformation zone component  Quality Indicator: Partially obscuring inflammation     General Categorization  Negative for intraepithelial lesion or malignancy.     Descriptive Interpretation  Negative for intraepithelial lesion or malignancy  Shift in vaginal jeimy suggestive of bacterial vaginosis                  Have you ever had an abnormal Mammogram?   no  Date & result of your last mammogram:  NA  Do you have pelvic pain?  no  How many times per week do you have intercourse?    Do you have pain with intercourse?  NA  Do you use lubricants with intercourse?  NA  Do you have pain with bowel movements?         no     Do you have pain with a full bladder?  no    MENSTRUAL HISTORY  LMP:  [id number]  Cycle length:  28-35 days  Describe your bleeding: 3-8 days- double protects 1st 2 days  Dysmenorrhea:  has had increasing pain with menses     ENDOCRINE/INFERTILITY HISTORY  Duration of infertility:  NA  Coital Activity/week:  NA  Nipple Discharge:  no  Vision changes:  no  Headaches:  no  Excess hair growth:  no  Excessive hair loss:  no  Acne:  no  Oily skin:    Recent weight change  Weight gain:  no  Weight loss:  no  Exercise more than 3 times a week:  no      Past Medical History:   Diagnosis Date    Abnormal uterine bleeding 10/01/2024    Cervical intraepithelial neoplasia grade 1 10/01/2024    Class 1 obesity 03/28/2022    Cough, unspecified 11/05/2022    Depression 2008    Irregular menstrual cycle 03/28/2022    Pain in joint of right shoulder 08/17/2022    Pain of left eye 04/18/2023    Shortness of breath 10/07/2022     Past Surgical History:   Procedure Laterality Date    APPENDECTOMY  2018     Current Outpatient Medications on File Prior to Visit   Medication Sig Dispense Refill    ibuprofen 200 mg tablet Take 4 tablets (800 mg) by mouth every 6 hours if needed for mild pain (1 - 3).      prenatal vit37/iron/folic acid (PRENATA ORAL) Take 1 each by mouth once daily.      ascorbic acid (Vitamin C) 500 mg tablet Take 1 tablet (500 mg) by mouth once daily.      cholecalciferol, vitamin D3, (VITAMIN D3 ORAL) Take 1 each by mouth once daily.      [DISCONTINUED] albuterol 90 mcg/actuation inhaler INHALE 2 PUFFS BY MOUTH EVERY 6 HOURS AS NEEDED FOR WHEEZING OR SHORTNESS OF BREATH OR COUGH (Patient not taking: Reported on 1/31/2025) 6.7 g 11    [DISCONTINUED]  azelastine (Astelin) 137 mcg (0.1 %) nasal spray Administer 1 spray into each nostril 2 times a day. Use in each nostril as directed (Patient not taking: Reported on 2025) 30 mL 1    [DISCONTINUED] fluticasone (Flonase) 50 mcg/actuation nasal spray Administer 1 spray into each nostril once daily. Shake gently. Before first use, prime pump. After use, clean tip and replace cap. (Patient not taking: Reported on 2025) 16 g 1     No current facility-administered medications on file prior to visit.       BMI:   BMI Readings from Last 1 Encounters:   25 34.01 kg/m²     VITALS:  LMP 2025 (Exact Date)   LMP: Patient's last menstrual period was 2025 (exact date).    ASSESSMENT   29 y.o.  female with male factor infertility, suspected ovulation and the following pertinent medical issues: same sex female couple for Donor IUI, c/o heavier menses and dysmenorrhea.    COUNSELING    Discussed use of luteal phase progesterone. Micronized progesterone (Prometrium) is an oral pill used vaginally to increase absorption to the uterus and decrease systemic side effects. It should be started 3 days post IUI, 4 days post + OPK, or 5 days post HCG trigger. A urine pregnancy test should be taken approximately two weeks after ovulation, if + stay on the progesterone and call the office, if negative stop the progesterone or it may delay the onset of menses.     We discussed that Letrozole is used for ovulation induction and that recent studies have found letrozole is superior to Clomid for ovulation induction in patients with PCOS. We discussed common side effects of Letrozole including headaches, vision changes, hot flashes, mood changes, and breast tenderness.  Letrozole is NOT FDA approved for the treatment of infertility and was initially associated with (in some studies) a higher risk of congenital anomalies, although this was not found in a more recent large study of patients taking Letrozole for  unexplained infertility. Patients must take a pregnancy test prior to starting Letrozole each month. We discussed that there is an increased risk of multiple gestation with Letrozole approximately 10% for twins and less than 1% for triplets.  Counseled regarding option of selective reduction for higher order multiples.     We discussed the options for donor sperm, including using a directed vs. unidentified donor; we discussed the pros and cons of each option.  We discussed that for unidentified donors that offspring may be able to identify the donor and/or other offspring from the same donor in the future. We discussed the costs and logistics of obtaining the sperm and the use of a sperm bank.  We discussed options for conceiving including natural cycle IUI, medicated IUI, and IVF.  We discussed natural fecundity and the need for fertility testing.  We discussed the Fertility Center policy to obtain donor clearance prior to ordering and shipping sperm. Donor sperm must be on site prior to treatment cycle start.    DONOR SPERM FACILITIES     For your convenience, below is a list of commonly used donor sperm facilities by patients at [facility].   The facilities listed below adhere to CLIA guidelines and are FDA approved for .      [facility] ([facility])   [facility])   [facility]   [facility]   [facility]   [facility]      If the facility you wish to use is not listed, you may request a review by the  and third party manager. Approval is not guaranteed and is granted on a case-by-case basis.   The donor of your choice will be reviewed by our third party team. Upon approval, please confirm with your clinical team before shipping. Samples and/or donors that have not been approved may not be accepted.       Please note that [facility] has no affiliation or  agency relationship with any of the donor facilities listed above, and it disclaims any and all responsibility for tissue or other property transferred from such storage facilities.      Routine Testing  Fertility Center  STDs Within 1 year   Genetic carrier Waiver/Completed   T&S Within 1 year   AMH Within 1 year   TSH Within 1 year   Rubella/Varicella Within 5 years     BMI Testing  Fertility Center  CBC Within 1 year   CMP Within 1 year   HgbA1c Within 1 year   Mag, Phos, Vit D <18 Within 1 year   MFM > 40  REQ   Wt loss consult > 40 OPT     PLAN  Final decision made: patient will proceed with Anonymous Sperm Donor  with [employer], is approved by HANNY Bran  Genetics  discussed that after practice review we do not accept sperm donors from [employer] since they do their own genetic matching, this is outside of our practice guidelines since we have  Genetic Counselors approve all sperm donors from a genetics standpoint.   Given list of other sperm zhu and to reach back with final decision. HANNY Bran with  Genetics to review any sperm donor   Need Engaged MD forms to final approve Donor/IUI process  Take prenatal vitamins, vitamin D 2000 IUs daily  Discussed that treatment cannot proceed until checklist items are complete.   Additional testing for BMI < 18 or > 40: NA.  Chart to primary nurse for care coordination and patient check list/education.    MD Completion:  Ectopic Risk: No  Medically Complex: No       Intimate Exam Performed: No, an intimate exam was not performed at this encounter.     Partner:  Nothing at this time      Outstanding boarding pass items: fill out Engaged MD forms to order donor sperm       FOLLOW UP PLAN:   Letrozole 2.5 mg days 3-7/monitoring/HCG Trigger/Donor IUI + LP Prometrium 100 mg BID PV to start 3 days after IUI x 3 cycles  If no success- re-evaluate    Rose Catalan CNP 03/17/25 7:39 AM

## 2025-03-19 ENCOUNTER — TELEPHONE (OUTPATIENT)
Dept: PRIMARY CARE | Facility: CLINIC | Age: 30
End: 2025-03-19
Payer: COMMERCIAL

## 2025-03-19 DIAGNOSIS — Z00.00 WELL ADULT EXAM: ICD-10-CM

## 2025-03-19 NOTE — TELEPHONE ENCOUNTER
F/T CPE 05-14-25 [facility] (Former [facility] Patient)    Will Patient need labs?    Patient can be reached at [phone]

## 2025-03-31 ENCOUNTER — TELEPHONE (OUTPATIENT)
Dept: ENDOCRINOLOGY | Facility: CLINIC | Age: 30
End: 2025-03-31
Payer: COMMERCIAL

## 2025-03-31 ENCOUNTER — TELEPHONE (OUTPATIENT)
Dept: ENDOCRINOLOGY | Facility: CLINIC | Age: 30
End: 2025-03-31

## 2025-03-31 DIAGNOSIS — Z31.89 ENCOUNTER FOR ARTIFICIAL INSEMINATION: ICD-10-CM

## 2025-03-31 DIAGNOSIS — N97.9 FEMALE INFERTILITY: ICD-10-CM

## 2025-03-31 RX ORDER — CHORIONIC GONADOTROPIN 10000 UNIT
10000 KIT INTRAMUSCULAR ONCE AS NEEDED
Qty: 1 EACH | Refills: 0 | Status: SHIPPED | OUTPATIENT
Start: 2025-03-31

## 2025-03-31 RX ORDER — PROGESTERONE 100 MG/1
100 CAPSULE ORAL 2 TIMES DAILY
Qty: 60 CAPSULE | Refills: 3 | Status: SHIPPED | OUTPATIENT
Start: 2025-03-31 | End: 2026-03-31

## 2025-03-31 RX ORDER — LETROZOLE 2.5 MG/1
2.5 TABLET, FILM COATED ORAL DAILY
Qty: 5 TABLET | Refills: 0 | Status: SHIPPED | OUTPATIENT
Start: 2025-03-31 | End: 2025-06-29

## 2025-03-31 NOTE — TELEPHONE ENCOUNTER
Patient called with menses for IUI cycle  Cycle #: Donor IUI #1   Medication: Letrozole 2.5 mg   Ovulation: Trigger  Sperm Source:  donor  Approved donor sperm number: Anonymous Sperm Donor  with [employer], is approved by HANNY Bran  Genetics   Luteal Support: Prometrium  Additional Medications:  N/A    Boarding Pass signed off: Boarding Pass reviewed with HANNY Mcintyre RN and is complete. patient is cleared for fertility treatment: Letrozole 2.5 mg days 3-7/monitoring/HCG Trigger/Donor IUI + LP Prometrium 100 mg BID PV to start 3 days after IUI x 3 cycles. If no success- re-evaluate. Rose Catalan CNP 03/26/25 9:18 PM     IUI order pended: pended to NPOD on 3/31/25  Additional Information: attempted to reach patient with above plan. Detailed VM left for patient as well as my chart message sent to patient with plan. Orders pended. Message sent to FD for scheduling.   GREG MCINTYRE on 3/31/25 at 3:09 PM.

## 2025-04-01 ENCOUNTER — SPECIALTY PHARMACY (OUTPATIENT)
Dept: PHARMACY | Facility: CLINIC | Age: 30
End: 2025-04-01

## 2025-04-01 ENCOUNTER — PHARMACY VISIT (OUTPATIENT)
Dept: PHARMACY | Facility: CLINIC | Age: 30
End: 2025-04-01
Payer: COMMERCIAL

## 2025-04-01 PROCEDURE — RXMED WILLOW AMBULATORY MEDICATION CHARGE

## 2025-04-07 DIAGNOSIS — N97.9 FEMALE INFERTILITY: ICD-10-CM

## 2025-04-08 ENCOUNTER — ANCILLARY PROCEDURE (OUTPATIENT)
Dept: ENDOCRINOLOGY | Facility: CLINIC | Age: 30
End: 2025-04-08
Payer: COMMERCIAL

## 2025-04-08 ENCOUNTER — LAB REQUISITION (OUTPATIENT)
Dept: LAB | Facility: HOSPITAL | Age: 30
End: 2025-04-08
Payer: COMMERCIAL

## 2025-04-08 DIAGNOSIS — N97.9 FEMALE INFERTILITY, UNSPECIFIED: ICD-10-CM

## 2025-04-08 DIAGNOSIS — N97.9 FEMALE INFERTILITY: ICD-10-CM

## 2025-04-08 LAB
ESTRADIOL SERPL-MCNC: 232 PG/ML
LH SERPL-ACNC: 18.7 IU/L
PROGEST SERPL-MCNC: 1 NG/ML

## 2025-04-08 PROCEDURE — 82670 ASSAY OF TOTAL ESTRADIOL: CPT

## 2025-04-08 PROCEDURE — 83002 ASSAY OF GONADOTROPIN (LH): CPT

## 2025-04-08 PROCEDURE — 76857 US EXAM PELVIC LIMITED: CPT

## 2025-04-08 PROCEDURE — 76857 US EXAM PELVIC LIMITED: CPT | Performed by: OBSTETRICS & GYNECOLOGY

## 2025-04-08 PROCEDURE — 84144 ASSAY OF PROGESTERONE: CPT

## 2025-04-08 NOTE — PROGRESS NOTES
CYCLING NOTE- Donor IUI  Cycle #:  Donor IUI #1  Reason For Treatment: male infertility   Protocol: Letrozole 2.5 mg days 3-7/monitoring/HCG Trigger/Donor IUI + LP Prometrium 100 mg BID PV to start 3 days after IUI   CD 11  Patient Hx: 28 yo patient of CYNDI Mcnair  Notes:  Anonymous Sperm Donor  with [employer], is approved by HANNY Bran,  Genetics     Here for US and/or lab monitoring; relevant findings reviewed.  Patient stayed for nurse visit. Pain is 0/10  Team will contact patient later today with results and plan.    Evelin Lam  04/08/2025  7:59 AM      HCG Trigger    Trigger type: Inject HCG 10,000 units subcutaneously  Trigger instructions reviewed.   Patient verbalizes understanding of plan and location of procedure.  Patient scheduled for IUI tomorrow 4/9 and advised to start vaginal progesterone 3 days after IUI  Patient instructed to call with start of next menses or positive home pregnancy test.     Evelin Lam  04/08/2025  1:38 PM

## 2025-04-09 ENCOUNTER — PROCEDURE VISIT (OUTPATIENT)
Dept: ENDOCRINOLOGY | Facility: CLINIC | Age: 30
End: 2025-04-09

## 2025-04-09 ENCOUNTER — ANCILLARY PROCEDURE (OUTPATIENT)
Dept: ENDOCRINOLOGY | Facility: CLINIC | Age: 30
End: 2025-04-09

## 2025-04-09 DIAGNOSIS — N97.9 FEMALE INFERTILITY: ICD-10-CM

## 2025-04-09 DIAGNOSIS — Z31.89 ENCOUNTER FOR ARTIFICIAL INSEMINATION: ICD-10-CM

## 2025-04-09 LAB
ANALYZED TIME:: NORMAL
ANDROLOGY LAB ID#: NORMAL
CONCENTRATION CN (POST-WASH): 15.93 MILL/ML
CRYOBANK: NORMAL
DONOR ID: NORMAL
NON PROG. MOTILITY CN (POST-WASH): 5 %
PROG. MOTILITY CN (POST-WASH): 59 %
RECEIVED TIME:: NORMAL
REI PARTNER DOB: NORMAL
REI PARTNER NAME: NORMAL
REMAINING VIALS: 0
THAWING PROTOCOL: NORMAL
TOTAL MOTILITY CN (POST-WASH): 64 %
TOTAL NO OF MOTILE CN (POST-WASH): 5.1 MILL
TOTAL NO OF SPERM CN (POST-WASH): 7.96 MILL
VIAL ID: NORMAL
VOLUME CN (POST-WASH): 0.5 ML

## 2025-04-09 PROCEDURE — 89353 THAWING CRYOPRESRVED SPERM: CPT | Performed by: OBSTETRICS & GYNECOLOGY

## 2025-04-09 PROCEDURE — 58322 ARTIFICIAL INSEMINATION: CPT | Performed by: NURSE PRACTITIONER

## 2025-04-09 NOTE — PROGRESS NOTES
"Patient ID: Chichi Trevino \"Mary\" is a 29 y.o. female.    Intrauterine Insemination (IUI)    Date/Time: 4/9/2025 9:31 AM    Performed by: SCOTTY Bob  Authorized by: SCOTTY Bob    Consent:     Consent obtained:  Verbal and written    Consent given by:  Patient    Procedure risks and benefits discussed: yes      Patient questions answered: yes      Patient agrees, verbalizes understanding, and wants to proceed: yes      Educational handouts given: yes      Instructions and paperwork completed: yes    Procedure:     Pelvic exam performed: yes      Speculum placed in vagina: yes      Tenaculum applied to cervix: no      Type of insemination: intrauterine insemination      Ultrasound guidance: No      Speculum type: Stephanie      Catheter type: curved      Curvature: mild      Difficulty: easy      Estimated Blood Loss:  None    Specimen Return: none    Post-procedure:     Patient tolerated procedure well: yes      Post-procedure plan: patient counseled on signs and symptoms for which to call and/or return to clinic    Comments:     Procedure comments:  IUI Procedure note:    The patient presented today for IUI.     Consent signed by patient, IUI sample identified by patient, and Final Verification performed with patient via electronic system \"\" prior to insemination.   All patient's questions were discussed and answered.      Patient declined.    Specimen Source: Donor  Specimen Condition: Frozen  TMS 5.11 mil    Additional notes:    Patient was advised to call office if she develops fever, chills, pelvic pain, or heavy bleeding.    Progesterone and post-IUI teaching completed. Will start Progesterone three days after IUI and continue twice daily. Pt will do a home pregnancy test two weeks from IUI date. If home pregnancy test positive, patient will continue Progesterone and call office to schedule BHCG. If home pregnancy test negative, patient will discontinue Progesterone, and " was advised to call office with start of menses; will proceed with another cycle if appropriate.  Patient verbalized understanding of plan.        Lorena Sharma 04/09/25 9:31 AM

## 2025-04-14 ENCOUNTER — APPOINTMENT (OUTPATIENT)
Dept: PRIMARY CARE | Facility: CLINIC | Age: 30
End: 2025-04-14
Payer: COMMERCIAL

## 2025-04-14 VITALS
HEART RATE: 68 BPM | BODY MASS INDEX: 33.7 KG/M2 | HEIGHT: 63 IN | TEMPERATURE: 97.2 F | OXYGEN SATURATION: 98 % | DIASTOLIC BLOOD PRESSURE: 80 MMHG | SYSTOLIC BLOOD PRESSURE: 122 MMHG | WEIGHT: 190.2 LBS

## 2025-04-14 DIAGNOSIS — Z00.00 ANNUAL PHYSICAL EXAM: Primary | ICD-10-CM

## 2025-04-14 PROCEDURE — 99395 PREV VISIT EST AGE 18-39: CPT

## 2025-04-14 PROCEDURE — G8433 SCR FOR DEP NOT CPT DOC RSN: HCPCS

## 2025-04-14 PROCEDURE — 3008F BODY MASS INDEX DOCD: CPT

## 2025-04-14 PROCEDURE — 1036F TOBACCO NON-USER: CPT

## 2025-04-14 ASSESSMENT — PROMIS GLOBAL HEALTH SCALE
CARRYOUT_PHYSICAL_ACTIVITIES: COMPLETELY
RATE_MENTAL_HEALTH: VERY GOOD
RATE_PHYSICAL_HEALTH: GOOD
RATE_QUALITY_OF_LIFE: VERY GOOD
RATE_AVERAGE_PAIN: 2
RATE_AVERAGE_FATIGUE: MODERATE
CARRYOUT_SOCIAL_ACTIVITIES: VERY GOOD
EMOTIONAL_PROBLEMS: RARELY
RATE_SOCIAL_SATISFACTION: VERY GOOD
RATE_GENERAL_HEALTH: GOOD

## 2025-04-14 ASSESSMENT — PATIENT HEALTH QUESTIONNAIRE - PHQ9
1. LITTLE INTEREST OR PLEASURE IN DOING THINGS: NOT AT ALL
SUM OF ALL RESPONSES TO PHQ9 QUESTIONS 1 AND 2: 0
2. FEELING DOWN, DEPRESSED OR HOPELESS: NOT AT ALL

## 2025-04-14 ASSESSMENT — VISUAL ACUITY: OU: 1

## 2025-04-14 NOTE — PROGRESS NOTES
"Subjective   Patient ID: Mary Trevino is a 29 y.o. female who presents for Annual Exam.    HPI     Chichi Trevino presents for annual physical exam.  Previously patient of [facility], establishing with this provider today.  No new concerns at this visit.  No recent illness or hospitalizations.    Working with fertility specialist for pregnancy with her wife-IUI procedure last week hoping for resulting pregnancy.    Patient's recent visit notes, medication and allergy lists, past medical surgical social hx, immunization, vitals, problem list, recent tests were reviewed by me for pertinence to this visit.      PMH:   No chronic conditions      Social Hx:  , female partner   for [employer]  Smoking: No  Alcohol: No  Recreational drug use: No      Screening tools:  PAP: Yes - follows with Dr. Chen/negative HPV      Vaccinations:  Tdap: due for update- will plan to update in 3rd trimester of pregnancy  Flu Vaccine: due in the fall        Review of Systems  GENERAL - Denies fever/chills, recent illness, unexplained weight loss  HEENT- Denies change in vision, double vision, blurred. Denies hearing changes, ear pain. Denies nose bleeds. Denies sore throat, difficulty swallowing.    RESP - Denies SOB or cough  CVS - Denies CP, palpitations  GI - Denies nausea or abdominal pain, hematochezia/melena  - Denies urinary frequency, urgency or incontinence.  Denies nocturia.   NEURO - Denies headache, dizziness  MSK - Denies joint, neck or back pain  Skin - Denies abnormal lesions, rash  PSYCH-Denies anxiety, depression, changes in mood      Objective   /80 (BP Location: Left arm, Patient Position: Sitting, BP Cuff Size: Adult)   Pulse 68   Temp 36.2 °C (97.2 °F) (Temporal)   Ht 1.6 m (5' 3\")   Wt 86.3 kg (190 lb 3.2 oz)   LMP 03/03/2025 (Exact Date)   SpO2 98%   BMI 33.69 kg/m²     Physical Exam  Vitals and nursing note reviewed.   Constitutional:       General: She is not in acute " distress.     Appearance: Normal appearance. She is well-developed and well-groomed.   HENT:      Head: Normocephalic.      Jaw: There is normal jaw occlusion.      Right Ear: Hearing, tympanic membrane, ear canal and external ear normal.      Left Ear: Hearing, tympanic membrane, ear canal and external ear normal.      Nose: Nose normal.      Mouth/Throat:      Lips: Pink.      Mouth: Mucous membranes are moist.      Pharynx: Oropharynx is clear. Uvula midline.   Eyes:      General: Lids are normal. Vision grossly intact. Gaze aligned appropriately.      Extraocular Movements: Extraocular movements intact.      Conjunctiva/sclera: Conjunctivae normal.      Pupils: Pupils are equal, round, and reactive to light.   Neck:      Thyroid: No thyromegaly or thyroid tenderness.      Vascular: No carotid bruit or JVD.      Trachea: Trachea and phonation normal.   Cardiovascular:      Rate and Rhythm: Normal rate and regular rhythm.      Pulses: Normal pulses.      Heart sounds: Normal heart sounds, S1 normal and S2 normal.   Pulmonary:      Effort: Pulmonary effort is normal.      Breath sounds: Normal breath sounds and air entry.   Abdominal:      General: Bowel sounds are normal. There is no distension.      Palpations: Abdomen is soft. There is no hepatomegaly, splenomegaly or mass.      Tenderness: There is no abdominal tenderness. There is no right CVA tenderness, left CVA tenderness, guarding or rebound.   Musculoskeletal:         General: Normal range of motion.      Cervical back: Normal, full passive range of motion without pain, normal range of motion and neck supple.      Thoracic back: Normal.      Lumbar back: Normal.      Right lower leg: No edema.      Left lower leg: No edema.   Lymphadenopathy:      Cervical: No cervical adenopathy.   Skin:     General: Skin is warm and dry.      Capillary Refill: Capillary refill takes less than 2 seconds.   Neurological:      General: No focal deficit present.      Mental  Status: She is alert and oriented to person, place, and time.      Cranial Nerves: Cranial nerves 2-12 are intact.      Sensory: Sensation is intact.      Motor: Motor function is intact.      Coordination: Coordination is intact.      Gait: Gait is intact.   Psychiatric:         Attention and Perception: Attention normal.         Mood and Affect: Mood and affect normal.         Speech: Speech normal.         Behavior: Behavior normal. Behavior is cooperative.           Assessment & Plan  Annual physical exam  Well adult exam.  1. Age appropriate preventative measures reviewed.   2. Encouraged healthy diet and exercise.  3. Immunizations- Reviewed, will update Tdap at 3rd trimester   4. Labs- ordered at this visit  5. Medications-no chronic medications    *Follow-up in 1 year for repeat annual physical exam. Patient verbalizes understanding  regarding plan of care and all questions answered.    Orders:    Lipid Panel; Future    CBC and Auto Differential; Future    Comprehensive metabolic panel; Future

## 2025-04-28 ENCOUNTER — TELEPHONE (OUTPATIENT)
Dept: ENDOCRINOLOGY | Facility: CLINIC | Age: 30
End: 2025-04-28
Payer: COMMERCIAL

## 2025-04-28 DIAGNOSIS — N97.9 FEMALE INFERTILITY: ICD-10-CM

## 2025-04-28 RX ORDER — LETROZOLE 2.5 MG/1
2.5 TABLET, FILM COATED ORAL DAILY
Qty: 5 TABLET | Refills: 0 | Status: SHIPPED | OUTPATIENT
Start: 2025-04-28 | End: 2025-07-27

## 2025-04-28 RX ORDER — CHORIONIC GONADOTROPIN 10000 UNIT
10000 KIT INTRAMUSCULAR ONCE AS NEEDED
Qty: 1 EACH | Refills: 0 | Status: SHIPPED | OUTPATIENT
Start: 2025-04-28

## 2025-04-28 RX ORDER — PROGESTERONE 100 MG/1
100 CAPSULE ORAL 2 TIMES DAILY
Qty: 60 CAPSULE | Refills: 3 | Status: SHIPPED | OUTPATIENT
Start: 2025-04-28 | End: 2026-04-28

## 2025-04-28 NOTE — TELEPHONE ENCOUNTER
Patient called with menses for IUI cycle  Cycle #: Donor IUI #2  Medication: Letrozole 2.5 mg   Ovulation: Trigger  Sperm Source:  donor  Approved donor sperm number: Anonymous Sperm Donor  with [employer], is approved by [employer] [employer]  [employer]   Luteal Support: Prometrium  Additional Medications:  N/A     Boarding Pass signed off: Boarding Pass reviewed with HANNY Mcintyre RN and is complete. patient is cleared for fertility treatment: Letrozole 2.5 mg days 3-7/monitoring/HCG Trigger/Donor IUI + LP Prometrium 100 mg BID PV to start 3 days after IUI x 3 cycles. If no success- re-evaluate. Rose Catalan CNP 03/26/25 9:18 PM      IUI order pended: pended to NPOD on 4/28/25  Additional Information: attempted to reach patient with above plan. Detailed VM left for patient as well as my chart message sent to patient with plan. Orders pended. Message sent to FD for scheduling.   GREG MCINTYRE on 4/28/25 at 2:04 PM.

## 2025-04-29 DIAGNOSIS — N97.9 FEMALE INFERTILITY: ICD-10-CM

## 2025-04-30 ENCOUNTER — SPECIALTY PHARMACY (OUTPATIENT)
Dept: PHARMACY | Facility: CLINIC | Age: 30
End: 2025-04-30

## 2025-04-30 DIAGNOSIS — N97.9 FEMALE INFERTILITY: ICD-10-CM

## 2025-05-01 DIAGNOSIS — N97.9 FEMALE INFERTILITY: ICD-10-CM

## 2025-05-02 DIAGNOSIS — N97.9 FEMALE INFERTILITY: ICD-10-CM

## 2025-05-03 DIAGNOSIS — N97.9 FEMALE INFERTILITY: ICD-10-CM

## 2025-05-04 DIAGNOSIS — N97.9 FEMALE INFERTILITY: ICD-10-CM

## 2025-05-05 DIAGNOSIS — N97.9 FEMALE INFERTILITY: ICD-10-CM

## 2025-05-14 ENCOUNTER — APPOINTMENT (OUTPATIENT)
Dept: PRIMARY CARE | Facility: CLINIC | Age: 30
End: 2025-05-14
Payer: COMMERCIAL

## 2025-05-23 ENCOUNTER — TELEPHONE (OUTPATIENT)
Dept: ENDOCRINOLOGY | Facility: CLINIC | Age: 30
End: 2025-05-23
Payer: COMMERCIAL

## 2025-05-23 NOTE — TELEPHONE ENCOUNTER
Reason for call:   Notes: Patient had IUI on 4/9 and started spotting today. She is wondering if she should start medication over the weekend to do another round. Please contact patient

## 2025-05-23 NOTE — TELEPHONE ENCOUNTER
Attempted to return patient call twice. Patient answered then hung up. Follow up my chart sent to patient.   GREG OBANDO on 5/23/25 at 4:06 PM.

## 2025-05-27 ENCOUNTER — TELEPHONE (OUTPATIENT)
Dept: ENDOCRINOLOGY | Facility: CLINIC | Age: 30
End: 2025-05-27
Payer: COMMERCIAL

## 2025-05-27 ENCOUNTER — PHARMACY VISIT (OUTPATIENT)
Dept: PHARMACY | Facility: CLINIC | Age: 30
End: 2025-05-27
Payer: COMMERCIAL

## 2025-05-27 DIAGNOSIS — Z31.89 ENCOUNTER FOR ARTIFICIAL INSEMINATION: ICD-10-CM

## 2025-05-27 DIAGNOSIS — N97.9 FEMALE INFERTILITY: ICD-10-CM

## 2025-05-27 PROCEDURE — RXMED WILLOW AMBULATORY MEDICATION CHARGE

## 2025-05-27 NOTE — TELEPHONE ENCOUNTER
Patient called with menses for IUI cycle  Cycle #: Donor IUI #2 (didn't start previous cycle patient called for on 4/28 d/t travel)   Medication: Letrozole 2.5 mg   Ovulation: Trigger  Sperm Source:  donor  Approved donor sperm number: Anonymous Sperm Donor  with [employer], is approved by [employer]  [employer]   Luteal Support: Prometrium  Additional Medications:  N/A     Boarding Pass signed off: Boarding Pass reviewed with HANNY Mcintyre RN and is complete. patient is cleared for fertility treatment: Letrozole 2.5 mg days 3-7/monitoring/HCG Trigger/Donor IUI + LP Prometrium 100 mg BID PV to start 3 days after IUI x 3 cycles. If no success- re-evaluate. Rose Catalan CNP 03/26/25 9:18 PM      IUI order pended: pended to NPOD on 5/27/25  Sperm Prep pended on 5/27/25  Additional Information: returned patient call regarding above plan. Patient states she has Letrozole still from previous cycle she did not end up starting. Patient instructed to take her Letrozole 2.5 mg CD 3-7 after confirming negative UPT. Patient instructed to call [facility] for delivery of trigger shot. Patient instructed to completed andrology donor sperm packet ASAP and have sperm shipped to clinic. Patient instructed to RTC on CD 10 6/2/25 for monitoring. Patient agreeable and denies further questions/concerns. Patient transferred to front to schedule appointment.   GREG MCINTYRE on 5/27/25 at 10:24 AM.

## 2025-05-27 NOTE — TELEPHONE ENCOUNTER
Reason for call: reporting start of cycle  LMP: 05/24  Treatment type: IUI  Note: pt wants a call back to discuss next steps   negative no gross abnormalities

## 2025-05-28 DIAGNOSIS — N97.9 FEMALE INFERTILITY: ICD-10-CM

## 2025-05-29 DIAGNOSIS — N97.9 FEMALE INFERTILITY: ICD-10-CM

## 2025-05-30 DIAGNOSIS — N97.9 FEMALE INFERTILITY: ICD-10-CM

## 2025-05-31 DIAGNOSIS — N97.9 FEMALE INFERTILITY: ICD-10-CM

## 2025-06-01 DIAGNOSIS — N97.9 FEMALE INFERTILITY: ICD-10-CM

## 2025-06-02 ENCOUNTER — LAB REQUISITION (OUTPATIENT)
Dept: LAB | Facility: HOSPITAL | Age: 30
End: 2025-06-02
Payer: COMMERCIAL

## 2025-06-02 ENCOUNTER — ANCILLARY PROCEDURE (OUTPATIENT)
Dept: ENDOCRINOLOGY | Facility: CLINIC | Age: 30
End: 2025-06-02

## 2025-06-02 DIAGNOSIS — N97.9 FEMALE INFERTILITY: ICD-10-CM

## 2025-06-02 DIAGNOSIS — N97.9 FEMALE INFERTILITY, UNSPECIFIED: ICD-10-CM

## 2025-06-02 LAB
ESTRADIOL SERPL-MCNC: 83 PG/ML
LH SERPL-ACNC: 8.2 IU/L
PROGEST SERPL-MCNC: 1.1 NG/ML

## 2025-06-02 PROCEDURE — 84144 ASSAY OF PROGESTERONE: CPT

## 2025-06-02 PROCEDURE — 76857 US EXAM PELVIC LIMITED: CPT

## 2025-06-02 PROCEDURE — 82670 ASSAY OF TOTAL ESTRADIOL: CPT

## 2025-06-02 PROCEDURE — 83002 ASSAY OF GONADOTROPIN (LH): CPT

## 2025-06-02 NOTE — PROGRESS NOTES
CYCLING NOTE    Here for US and/or lab monitoring; relevant findings reviewed.    Cycle #: Donor IUI #2 (didn't start previous cycle patient called for on 4/28 d/t travel)   Reason For Treatment: Male Infertility   Protocol: Letrozole 2.5 mg days 3-7/monitoring/HCG Trigger/Donor IUI + LP Prometrium 100 mg BID PV to start 3 days after IUI x 3 cycles.   CD: 10  Patient Hx: 29 year old patient of Rose Catalan, CNP. [facility]- 3.268.   Notes: Anonymous Sperm Donor  with [employer], is approved by [employer],  Genetics     Patient did not stay for discussion after monitoring,  Team will contact patient later today with results and plan.    Greg Mcintyre  06/02/2025  8:00 AM    Spoke with patient regarding plan from meeting. Patient agreeable and denies further questions/concerns. Patient transferred to front to schedule appointment.   GREG MCINTYRE on 6/2/25 at 11:23 AM.

## 2025-06-03 DIAGNOSIS — N97.9 FEMALE INFERTILITY: ICD-10-CM

## 2025-06-04 ENCOUNTER — ANCILLARY PROCEDURE (OUTPATIENT)
Dept: ENDOCRINOLOGY | Facility: CLINIC | Age: 30
End: 2025-06-04

## 2025-06-04 ENCOUNTER — LAB REQUISITION (OUTPATIENT)
Dept: LAB | Facility: HOSPITAL | Age: 30
End: 2025-06-04
Payer: COMMERCIAL

## 2025-06-04 DIAGNOSIS — N97.9 FEMALE INFERTILITY: ICD-10-CM

## 2025-06-04 DIAGNOSIS — N97.9 FEMALE INFERTILITY, UNSPECIFIED: ICD-10-CM

## 2025-06-04 LAB
ESTRADIOL SERPL-MCNC: 93 PG/ML
LH SERPL-ACNC: 8 IU/L
PROGEST SERPL-MCNC: 0.9 NG/ML

## 2025-06-04 PROCEDURE — 84144 ASSAY OF PROGESTERONE: CPT

## 2025-06-04 PROCEDURE — 83002 ASSAY OF GONADOTROPIN (LH): CPT

## 2025-06-04 PROCEDURE — 76857 US EXAM PELVIC LIMITED: CPT

## 2025-06-04 PROCEDURE — 82670 ASSAY OF TOTAL ESTRADIOL: CPT

## 2025-06-04 NOTE — PROGRESS NOTES
CYCLING NOTE    Here for US and/or lab monitoring; relevant findings reviewed.    Cycle #: Donor IUI #2 (didn't start previous cycle patient called for on 4/28 d/t travel)   Reason For Treatment: Male Infertility   Protocol: Letrozole 2.5 mg days 3-7/monitoring/HCG Trigger/Donor IUI + LP Prometrium 100 mg BID PV to start 3 days after IUI x 3 cycles.   CD: 12  Patient Hx: 29 year old patient of Rose Catalan, CNP. [facility]- 3.268.   Notes: Anonymous Sperm Donor  with [employer], is approved by [employer],  Genetics     Patient did not stay for discussion after monitoring,  Team will contact patient later today with results and plan.    Greg Mcintyre  06/04/2025  7:03 AM    Spoke with patient regarding plan from meeting. Patient agreeable and denies further questions/concerns. Patient transferred to front to schedule appointment.   GREG MCINTYRE on 6/4/25 at 1:42 PM.

## 2025-06-05 DIAGNOSIS — N97.9 FEMALE INFERTILITY: ICD-10-CM

## 2025-06-06 ENCOUNTER — ANCILLARY PROCEDURE (OUTPATIENT)
Dept: ENDOCRINOLOGY | Facility: CLINIC | Age: 30
End: 2025-06-06

## 2025-06-06 ENCOUNTER — LAB REQUISITION (OUTPATIENT)
Dept: LAB | Facility: HOSPITAL | Age: 30
End: 2025-06-06
Payer: COMMERCIAL

## 2025-06-06 DIAGNOSIS — N97.9 FEMALE INFERTILITY: ICD-10-CM

## 2025-06-06 DIAGNOSIS — N97.9 FEMALE INFERTILITY, UNSPECIFIED: ICD-10-CM

## 2025-06-06 LAB
ESTRADIOL SERPL-MCNC: 225 PG/ML
LH SERPL-ACNC: 15.4 IU/L
PROGEST SERPL-MCNC: 1 NG/ML

## 2025-06-06 PROCEDURE — 84144 ASSAY OF PROGESTERONE: CPT

## 2025-06-06 PROCEDURE — 76857 US EXAM PELVIC LIMITED: CPT

## 2025-06-06 PROCEDURE — 82670 ASSAY OF TOTAL ESTRADIOL: CPT

## 2025-06-06 PROCEDURE — 83002 ASSAY OF GONADOTROPIN (LH): CPT

## 2025-06-06 NOTE — PROGRESS NOTES
CYCLING NOTE    Here for US and/or lab monitoring; relevant findings reviewed.    Cycle #: Donor IUI #2 (didn't start previous cycle patient called for on 4/28 d/t travel)   Reason For Treatment: Male Infertility   Protocol: Letrozole 2.5 mg days 3-7/monitoring/HCG Trigger/Donor IUI + LP Prometrium 100 mg BID PV to start 3 days after IUI x 3 cycles.   CD: 14  Patient Hx: 29 year old patient of Rose Catalan, CNP. [facility]- 3.268.   Notes: Anonymous Sperm Donor  with [employer], is approved by [employer],  Genetics     Patient did not stay for discussion after monitoring,  Team will contact patient later today with results and plan.    Greg Mcintyre  06/06/2025  7:15 AM    Attempted to reach patient with plan from meeting. Detailed VM left for patient. Patient instructed to call office back to schedule for Monday and with any questions. Patient instructed to test with [facility] over the weekend and call if +surge. Message sent to  for scheduling.   GREG MCINTYRE on 6/6/25 at 1:39 PM.

## 2025-06-09 ENCOUNTER — ANCILLARY PROCEDURE (OUTPATIENT)
Dept: ENDOCRINOLOGY | Facility: CLINIC | Age: 30
End: 2025-06-09

## 2025-06-09 ENCOUNTER — LAB REQUISITION (OUTPATIENT)
Dept: LAB | Facility: HOSPITAL | Age: 30
End: 2025-06-09
Payer: COMMERCIAL

## 2025-06-09 DIAGNOSIS — N97.9 FEMALE INFERTILITY, UNSPECIFIED: ICD-10-CM

## 2025-06-09 DIAGNOSIS — N97.9 FEMALE INFERTILITY: ICD-10-CM

## 2025-06-09 LAB
ESTRADIOL SERPL-MCNC: 86 PG/ML
LH SERPL-ACNC: 13.9 IU/L
PROGEST SERPL-MCNC: 4.6 NG/ML

## 2025-06-09 PROCEDURE — 82670 ASSAY OF TOTAL ESTRADIOL: CPT

## 2025-06-09 PROCEDURE — 76857 US EXAM PELVIC LIMITED: CPT

## 2025-06-09 PROCEDURE — 84144 ASSAY OF PROGESTERONE: CPT

## 2025-06-09 PROCEDURE — 83002 ASSAY OF GONADOTROPIN (LH): CPT

## 2025-06-09 NOTE — PROGRESS NOTES
CYCLING NOTE    Here for US and/or lab monitoring; relevant findings reviewed.    IUI #: 2  Reason For Treatment: male - using donor sperm  Protocol: Letrozole 2.5 mg days 3-7/monitoring/HCG Trigger/Donor IUI + LP Prometrium 100 mg BID PV to start 3 days after IUI x 3 cycles   Day of cycle: 17  Patient Hx: 29yr old patient of BAMBI Caatlan CNP  Notes:     Patient did not stay for discussion after monitoring,  Team will contact patient later today with results and plan.    Jasmyn Obrien  06/09/2025  7:42 AM    MD note: patient already ovulated. Cancelled this cycle. Ok to increase letrozole to 5mg with next cycle. Patient to call with menses to set up monitoring visit.  Lorena Sharma 06/09/25 11:50 AM    Telephone call made to patient with MD plan - voicemail box received. Detailed voicemail left for patient with plan to call with next menses and we will increase Letrozole dose to 5 mg next cycle.     06/09/25 at 1:57 PM - Jasmyn Obrien RN

## 2025-06-23 ENCOUNTER — TELEPHONE (OUTPATIENT)
Dept: ENDOCRINOLOGY | Facility: CLINIC | Age: 30
End: 2025-06-23
Payer: COMMERCIAL

## 2025-06-23 DIAGNOSIS — Z31.89 ENCOUNTER FOR ARTIFICIAL INSEMINATION: ICD-10-CM

## 2025-06-23 DIAGNOSIS — N97.9 FEMALE INFERTILITY: ICD-10-CM

## 2025-06-23 RX ORDER — LETROZOLE 2.5 MG/1
2.5 TABLET, FILM COATED ORAL DAILY
Qty: 5 TABLET | Refills: 0 | Status: CANCELLED | OUTPATIENT
Start: 2025-06-23 | End: 2025-09-21

## 2025-06-23 NOTE — TELEPHONE ENCOUNTER
Reason for call: reporting start of cycle  LMP: 06/23  Treatment type: IUI  Note: pt wants a call back with next steps

## 2025-06-23 NOTE — TELEPHONE ENCOUNTER
Patient called with menses for IUI cycle  Cycle #: Donor IUI #2 (previous cycle cancelled)   Medication: Letrozole 5 mg   Ovulation: Trigger Pregnyl   Sperm Source:  donor  Approved donor sperm number: Anonymous Sperm Donor  with [employer], is approved by [employer]  [employer]   Luteal Support: Prometrium  Additional Medications: N/A    Boarding Pass signed off: Yes  Boarding Pass reviewed with HANNY Mcintyre RN and is complete. patient is cleared for fertility treatment: Letrozole 2.5 mg days 3-7/monitoring/HCG Trigger/Donor IUI + LP Prometrium 100 mg BID PV to start 3 days after IUI x 3 cycles. If no success- re-evaluate. Rose Catalan CNP 03/26/25 9:18 PM     MD note: patient already ovulated. Cancelled this cycle. Ok to increase letrozole to 5mg with next cycle. Patient to call with menses to set up monitoring visit.  Lorena Sharma 06/09/25 11:50 AM    IUI order pended: Yes  Sperm Prep order pended: Yes  Additional Information: returned patient call regarding LMP- 6/23/25. Patient instructed we will increase Letrozole dose as planned for this cycle. Patient instructed to take letrozole 5 mg CD 3-7 after confirming negative UPT. Patient instructed to RTC on CD 11 7/3/25 for monitoring. Patient still has trigger from previous cancelled cycle. Patient agreeable and denies further questions/concerns. Message sent to  for scheduling.   GREG MCINTYRE on 6/23/25 at 4:58 PM.

## 2025-06-24 RX ORDER — LETROZOLE 2.5 MG/1
5 TABLET, FILM COATED ORAL DAILY
Qty: 10 TABLET | Refills: 0 | Status: SHIPPED | OUTPATIENT
Start: 2025-06-24 | End: 2025-09-22

## 2025-06-25 DIAGNOSIS — N97.9 FEMALE INFERTILITY: ICD-10-CM

## 2025-06-26 DIAGNOSIS — N97.9 FEMALE INFERTILITY: ICD-10-CM

## 2025-06-27 DIAGNOSIS — N97.9 FEMALE INFERTILITY: ICD-10-CM

## 2025-06-28 DIAGNOSIS — N97.9 FEMALE INFERTILITY: ICD-10-CM

## 2025-06-29 DIAGNOSIS — N97.9 FEMALE INFERTILITY: ICD-10-CM

## 2025-06-30 DIAGNOSIS — N97.9 FEMALE INFERTILITY: ICD-10-CM

## 2025-07-01 DIAGNOSIS — N97.9 FEMALE INFERTILITY: ICD-10-CM

## 2025-07-03 ENCOUNTER — LAB REQUISITION (OUTPATIENT)
Dept: LAB | Facility: HOSPITAL | Age: 30
End: 2025-07-03
Payer: COMMERCIAL

## 2025-07-03 ENCOUNTER — ANCILLARY PROCEDURE (OUTPATIENT)
Dept: ENDOCRINOLOGY | Facility: CLINIC | Age: 30
End: 2025-07-03

## 2025-07-03 DIAGNOSIS — N97.9 FEMALE INFERTILITY: ICD-10-CM

## 2025-07-03 DIAGNOSIS — N97.9 FEMALE INFERTILITY, UNSPECIFIED: ICD-10-CM

## 2025-07-03 LAB
ESTRADIOL SERPL-MCNC: 251 PG/ML
LH SERPL-ACNC: 7.6 IU/L
PROGEST SERPL-MCNC: 0.9 NG/ML

## 2025-07-03 PROCEDURE — 82670 ASSAY OF TOTAL ESTRADIOL: CPT

## 2025-07-03 PROCEDURE — 84144 ASSAY OF PROGESTERONE: CPT

## 2025-07-03 PROCEDURE — 76857 US EXAM PELVIC LIMITED: CPT

## 2025-07-03 PROCEDURE — 83002 ASSAY OF GONADOTROPIN (LH): CPT

## 2025-07-03 NOTE — PROGRESS NOTES
CYCLING NOTE    Here for US and/or lab monitoring; relevant findings reviewed.    Cycle #: Donor IUI #2 (previous cycle cancelled)   Reason For Treatment: Male Infertility   Protocol: Letrozole 2.5 mg days 3-7/monitoring/HCG Trigger/Donor IUI + LP Prometrium 100 mg BID PV to start 3 days after IUI x 3 cycles.   Cycle Day: 11  Approved donor sperm number: Anonymous Sperm Donor  with [employer], is approved by [employer],  Genetics   Patient Hx: 29 year old patient of Rose Catalan, CNP. [facility]- 3.268.   Notes: N/A    Patient did not stay for discussion after monitoring,  Team will contact patient later today with results and plan.    Greg Mcintyre  07/03/2025  8:59 AM    Spoke with patient regarding plan from meeting as well as sent plan to patient via my chart. Patient agreeable and denies further questions/concerns. Patient transferred to  for scheduling.   GREG MCINTYRE on 7/3/25 at 12:46 PM.

## 2025-07-07 ENCOUNTER — PROCEDURE VISIT (OUTPATIENT)
Dept: ENDOCRINOLOGY | Facility: CLINIC | Age: 30
End: 2025-07-07

## 2025-07-07 ENCOUNTER — ANCILLARY PROCEDURE (OUTPATIENT)
Dept: ENDOCRINOLOGY | Facility: CLINIC | Age: 30
End: 2025-07-07

## 2025-07-07 DIAGNOSIS — Z31.89 ENCOUNTER FOR ARTIFICIAL INSEMINATION: ICD-10-CM

## 2025-07-07 DIAGNOSIS — N97.9 FEMALE INFERTILITY: ICD-10-CM

## 2025-07-07 LAB
ANALYZED TIME:: ABNORMAL
ANDROLOGY LAB ID#: ABNORMAL
CONCENTRATION CN (POST-WASH): 55.87 MILL/ML
CRYOBANK: ABNORMAL
DONOR ID: ABNORMAL
NON PROG. MOTILITY CN (POST-WASH): 7 %
PROG. MOTILITY CN (POST-WASH): 61 %
RECEIVED TIME:: ABNORMAL
REI PARTNER DOB: ABNORMAL
REI PARTNER NAME: ABNORMAL
REMAINING VIALS: 0
THAWING PROTOCOL: ABNORMAL
TOTAL MOTILITY CN (POST-WASH): 68 %
TOTAL NO OF MOTILE CN (POST-WASH): 19.34 MILL
TOTAL NO OF SPERM CN (POST-WASH): 28.49 MILL
VIAL ID: ABNORMAL
VOLUME CN (POST-WASH): 0.51 ML

## 2025-07-07 PROCEDURE — 89353 THAWING CRYOPRESRVED SPERM: CPT | Performed by: NURSE PRACTITIONER

## 2025-07-07 PROCEDURE — 58322 ARTIFICIAL INSEMINATION: CPT | Performed by: NURSE PRACTITIONER

## 2025-07-07 NOTE — PROGRESS NOTES
"Patient ID: Chichi Trevino \"Katie" is a 30 y.o. female.    Intrauterine Insemination    Date/Time: 7/7/2025 9:48 AM    Performed by: SCOTTY Bob  Authorized by: SCOTTY Bob    Consent:     Consent obtained:  Verbal and written    Consent given by:  Patient    Procedure risks and benefits discussed: yes      Patient questions answered: yes      Patient agrees, verbalizes understanding, and wants to proceed: yes      Educational handouts given: yes      Instructions and paperwork completed: yes    Procedure:     Specimen source: donor      Type of insemination: intrauterine insemination      Ultrasound guidance: No      Speculum type: Stephanie      Curvature: mild      Difficulty: easy      Estimated Blood Loss:  None    Specimen Return: none    Post-procedure:     Post-procedure plan: supine with hips elevated for 15 minutes and patient counseled on signs and symptoms for which to call and/or return to clinic    Comments:     Procedure comments:  [facility] [facility]. [facility]  Lorena Sharma 07/07/25 9:48 AM        "

## 2025-07-11 ENCOUNTER — TELEPHONE (OUTPATIENT)
Dept: ENDOCRINOLOGY | Facility: CLINIC | Age: 30
End: 2025-07-11
Payer: COMMERCIAL

## 2025-07-11 NOTE — TELEPHONE ENCOUNTER
Reason for call: Patient had her IUI on Monday and has a rash around her Belly button. It happened tie first time she had her IUI too. Please call her.  Notes:

## 2025-07-11 NOTE — TELEPHONE ENCOUNTER
Patient calling with complaints of a rash and itching around the umbilicus.  She reports  the area is red and warm to touch.  She denies fever, nausea/vomiting, SOB, wheezing and discharge from the umbilicus.  Symptoms started on Tuesday, 7/8.  She applied cortisone to the area 20 minutes before calling, unsure if this is helping yet.      Patient triggered with 10,00u Pregnyl on Saturday 7/5 for Monday IUI.  Symptoms started one day after IUI and she reports the same symptoms occurred after her IUI last month, although last month she experience discharge from the umbilicus.  Patient denies difficulty administering injection, used proper technique with administration.    Patient advised to upload a picture of the area to [facility], but will most likely need to follow-up with PCP for evaluation.      07/11/25 at 9:38 AM - WILTON BOND RN     Reviewed the above with Rose Catalan CNP. Patient to contact PCP or go to urgent care. My chart message sent to patient.   GREG OBANDO on 7/11/25 at 11:19 AM.

## 2025-07-22 ENCOUNTER — TELEPHONE (OUTPATIENT)
Dept: ENDOCRINOLOGY | Facility: CLINIC | Age: 30
End: 2025-07-22
Payer: COMMERCIAL

## 2025-07-22 NOTE — TELEPHONE ENCOUNTER
Returned patient call regarding abnormal spotting/vaginal pain. Patient is s/p IUI on 7/7/25. Patient states she had a negative UPT yesterday and stopped her Progesterone yesterday. Patient states that yesterday around noon she noted some light spotting that was a bit lighter today but not her normal flow and darker in color. Patient also states that she has been having some lower vagina pain/burning unlike her normal cramping pain. This RN reviewed symptoms with Dr. Whitehead. Per Dr. Whitehead bleeding may be light d/t progesterone and she should monitor for a week and if her normal menses does not start to contact the office for next steps. Patient also instructed to monitor pain in lower vaginal area/burning and if it continues/worsens to follow up with her OB. Patient agreeable and denies further questions/concerns.   GREG OBANDO on 7/22/25 at 3:53 PM.

## 2025-07-22 NOTE — TELEPHONE ENCOUNTER
Reason for call: Call Back  Notes: Patient stated she is unsure if her cycle has started or not. She started spotting yesterday around noon and stated it was a dark, reddish brown blood which is unusual for her. She has been experiencing vaginal pain.

## 2025-07-25 ENCOUNTER — TELEPHONE (OUTPATIENT)
Dept: ENDOCRINOLOGY | Facility: CLINIC | Age: 30
End: 2025-07-25
Payer: COMMERCIAL

## 2025-07-25 DIAGNOSIS — N97.9 FEMALE INFERTILITY: ICD-10-CM

## 2025-07-25 DIAGNOSIS — Z31.89 ENCOUNTER FOR ARTIFICIAL INSEMINATION: ICD-10-CM

## 2025-07-25 RX ORDER — LETROZOLE 2.5 MG/1
5 TABLET, FILM COATED ORAL DAILY
Qty: 10 TABLET | Refills: 0 | Status: SHIPPED | OUTPATIENT
Start: 2025-07-25 | End: 2025-10-23

## 2025-07-25 RX ORDER — CHORIONIC GONADOTROPIN 10000 UNIT
10000 KIT INTRAMUSCULAR ONCE AS NEEDED
Qty: 1 EACH | Refills: 0 | Status: SHIPPED | OUTPATIENT
Start: 2025-07-25

## 2025-07-25 NOTE — TELEPHONE ENCOUNTER
Returned patient call. Patient states she would like to take this month off d/t emotional stress of fertility process. Patient states she received notice that her approved donor is out of vials and they are unsure when the donor will have more. Patient instructed to reach out to genetics to send more profiles to have a new donor approved. Medications were already proactively ordered ahead of call so patient will not need new orders when she calls with next menses. Patient instructed to call office Monday to schedule a VFUV with NP. Patient agreeable and denies further questions/concerns.   GREG OBANDO on 7/25/25 at 4:38 PM.